# Patient Record
Sex: FEMALE | Race: WHITE | NOT HISPANIC OR LATINO | Employment: UNEMPLOYED | URBAN - METROPOLITAN AREA
[De-identification: names, ages, dates, MRNs, and addresses within clinical notes are randomized per-mention and may not be internally consistent; named-entity substitution may affect disease eponyms.]

---

## 2017-03-17 ENCOUNTER — TELEPHONE (OUTPATIENT)
Dept: PEDIATRICS | Facility: CLINIC | Age: 42
End: 2017-03-17

## 2017-03-17 DIAGNOSIS — G43.909 MIGRAINE WITHOUT STATUS MIGRAINOSUS, NOT INTRACTABLE, UNSPECIFIED MIGRAINE TYPE: ICD-10-CM

## 2017-03-17 RX ORDER — SUMATRIPTAN 100 MG/1
100 TABLET, FILM COATED ORAL
Qty: 18 TABLET | Refills: 0 | Status: SHIPPED | OUTPATIENT
Start: 2017-03-17 | End: 2022-04-01

## 2017-03-17 NOTE — TELEPHONE ENCOUNTER
Patient calls.  Asking for a refill on Imitrex.  It was d/c on the med list. On 11/2 when patient reported that Maxalt worked better at the time.  Patient saw Dr Kaur last time for migraine on 7/12/16.  Patient taking Maxalt, which is not always helping in case of more severe migraine.  Patient in [place] now, requesting to fill Imitrex for one month.  Dr Kaur not in the clinic today-will route to prescribing provider PCP-please advise if ok to fill.    Routing refill request to provider for review/approval because:  A break in medication    Zainab Knight RN  Message handled by Nurse Triage.

## 2017-03-17 NOTE — TELEPHONE ENCOUNTER
Notified pt, pt agrees to take only one tab of imitrex today. Called pharmacy, now I was notified that pt already picked up one tablet of imitrex today,  paid out of pocket. Need to start PA for more.  Started another encounter for PA.     Shelley, RN  Triage Nurse

## 2017-03-17 NOTE — TELEPHONE ENCOUNTER
Pt calling to notify that she took 2 tabs of maxalt 5 mg around 9:55 am for migraine, didn't get any better. So, she requested imitrex. Pharmacy told her that they can't dispense imitrex w/o provider approval. Pt is in [place].    Called pharmacy. Pt picked up maxalt this am, insurance won't pay for 2 different migraine med on one day w/o MD's approval.     Can pt take imitrex after she took 2 tabs of maxalt today? Will huddle with . Pt can be reached at [phone](OK to ).    Huddled with :  - because of pt already took Maxalt 10 mg, she can take only one tab of imitrex 100 mg today  - ok to provide verbal over ride to d/c maxalt & dispense imitrex    Shelley, RN  Triage Nurse

## 2017-03-17 NOTE — TELEPHONE ENCOUNTER
Pt was on maxalt, not helping. MD sent a rx for imitrex today. Pt already picked up maxalt from pharmacy today(lives in [place]). So, insurance won't pay for another migraine med for 3 months. Need to start PA for over ride. Please try to get PA done over the phone. Thanks.     Insurance: [facility]  Member ID: [id number]   [phone]    Please see the other encounter for details.     Shelley, RN  Triage Nurse

## 2017-03-17 NOTE — TELEPHONE ENCOUNTER
PA submitted over the phone, asked for urgent response, advised outcome will take 3 business days,await outcome, LMOM for pt with this information.

## 2017-03-17 NOTE — TELEPHONE ENCOUNTER
Just noticed that patient has not seen PCP since 2014, please advise if this should wait for Dr. Kaur, or route to covering providers at station B?    Zainab Knight RN  Message handled by Nurse Triage.

## 2017-03-20 NOTE — TELEPHONE ENCOUNTER
Pt would need to be seen to discuss preventive medications.  It sounds like she actually lives in [place] now and needs to establish with a physician there to review medication.

## 2017-03-20 NOTE — TELEPHONE ENCOUNTER
PA has been denied.     Reasoning behind determination:     1) Patient has not been evaluated for headache caused by medication overuse.     2) Patient is not currently using prophylactic medication or refused treatment with a prophylactic medication. Patient did not have side effect, FDA labeled contraindication, or allergy to these drugs.     **Doctor if you would like to discuss this case with a physician reviewer, anuradha call [employer] @ [phone]    Please advise how to continue.     Fernanda Cho CMA ([facility])

## 2018-01-18 ENCOUNTER — TELEPHONE (OUTPATIENT)
Dept: PEDIATRICS | Facility: CLINIC | Age: 43
End: 2018-01-18

## 2018-01-18 NOTE — TELEPHONE ENCOUNTER
"Patient Communication Preferences indicate  Do not contact  and/or communication by \"Phone\" is not preferred. Call not required per Outreach team.        Outreach ,  Marlena Toledo     "

## 2022-04-01 ENCOUNTER — ANCILLARY PROCEDURE (OUTPATIENT)
Dept: GENERAL RADIOLOGY | Facility: CLINIC | Age: 47
End: 2022-04-01
Attending: PHYSICIAN ASSISTANT
Payer: COMMERCIAL

## 2022-04-01 ENCOUNTER — OFFICE VISIT (OUTPATIENT)
Dept: FAMILY MEDICINE | Facility: CLINIC | Age: 47
End: 2022-04-01
Payer: COMMERCIAL

## 2022-04-01 VITALS
WEIGHT: 137 LBS | HEART RATE: 79 BPM | OXYGEN SATURATION: 99 % | BODY MASS INDEX: 24.27 KG/M2 | TEMPERATURE: 97.7 F | SYSTOLIC BLOOD PRESSURE: 102 MMHG | DIASTOLIC BLOOD PRESSURE: 68 MMHG

## 2022-04-01 DIAGNOSIS — G89.29 CHRONIC PAIN OF BOTH SHOULDERS: Primary | ICD-10-CM

## 2022-04-01 DIAGNOSIS — M79.645 PAIN OF FINGER OF LEFT HAND: ICD-10-CM

## 2022-04-01 DIAGNOSIS — M25.512 CHRONIC PAIN OF BOTH SHOULDERS: ICD-10-CM

## 2022-04-01 DIAGNOSIS — M25.511 CHRONIC PAIN OF BOTH SHOULDERS: ICD-10-CM

## 2022-04-01 DIAGNOSIS — M25.511 CHRONIC PAIN OF BOTH SHOULDERS: Primary | ICD-10-CM

## 2022-04-01 DIAGNOSIS — M25.512 CHRONIC PAIN OF BOTH SHOULDERS: Primary | ICD-10-CM

## 2022-04-01 DIAGNOSIS — G89.29 CHRONIC PAIN OF BOTH SHOULDERS: ICD-10-CM

## 2022-04-01 DIAGNOSIS — G43.909 MIGRAINE WITHOUT STATUS MIGRAINOSUS, NOT INTRACTABLE, UNSPECIFIED MIGRAINE TYPE: ICD-10-CM

## 2022-04-01 LAB
BASOPHILS # BLD AUTO: 0 10E3/UL (ref 0–0.2)
BASOPHILS NFR BLD AUTO: 0 %
EOSINOPHIL # BLD AUTO: 0.1 10E3/UL (ref 0–0.7)
EOSINOPHIL NFR BLD AUTO: 3 %
ERYTHROCYTE [DISTWIDTH] IN BLOOD BY AUTOMATED COUNT: 14.2 % (ref 10–15)
ERYTHROCYTE [SEDIMENTATION RATE] IN BLOOD BY WESTERGREN METHOD: 9 MM/HR (ref 0–20)
HCT VFR BLD AUTO: 39.7 % (ref 35–47)
HGB BLD-MCNC: 13.1 G/DL (ref 11.7–15.7)
LYMPHOCYTES # BLD AUTO: 1.9 10E3/UL (ref 0.8–5.3)
LYMPHOCYTES NFR BLD AUTO: 41 %
MCH RBC QN AUTO: 28.4 PG (ref 26.5–33)
MCHC RBC AUTO-ENTMCNC: 33 G/DL (ref 31.5–36.5)
MCV RBC AUTO: 86 FL (ref 78–100)
MONOCYTES # BLD AUTO: 0.3 10E3/UL (ref 0–1.3)
MONOCYTES NFR BLD AUTO: 7 %
NEUTROPHILS # BLD AUTO: 2.2 10E3/UL (ref 1.6–8.3)
NEUTROPHILS NFR BLD AUTO: 49 %
PLATELET # BLD AUTO: 239 10E3/UL (ref 150–450)
RBC # BLD AUTO: 4.62 10E6/UL (ref 3.8–5.2)
WBC # BLD AUTO: 4.5 10E3/UL (ref 4–11)

## 2022-04-01 PROCEDURE — 86431 RHEUMATOID FACTOR QUANT: CPT | Performed by: PHYSICIAN ASSISTANT

## 2022-04-01 PROCEDURE — 99204 OFFICE O/P NEW MOD 45 MIN: CPT | Performed by: PHYSICIAN ASSISTANT

## 2022-04-01 PROCEDURE — 85652 RBC SED RATE AUTOMATED: CPT | Performed by: PHYSICIAN ASSISTANT

## 2022-04-01 PROCEDURE — 84443 ASSAY THYROID STIM HORMONE: CPT | Performed by: PHYSICIAN ASSISTANT

## 2022-04-01 PROCEDURE — 73030 X-RAY EXAM OF SHOULDER: CPT | Mod: 50 | Performed by: RADIOLOGY

## 2022-04-01 PROCEDURE — 36415 COLL VENOUS BLD VENIPUNCTURE: CPT | Performed by: PHYSICIAN ASSISTANT

## 2022-04-01 PROCEDURE — 85025 COMPLETE CBC W/AUTO DIFF WBC: CPT | Performed by: PHYSICIAN ASSISTANT

## 2022-04-01 PROCEDURE — 73130 X-RAY EXAM OF HAND: CPT | Mod: LT | Performed by: RADIOLOGY

## 2022-04-01 RX ORDER — SUMATRIPTAN 100 MG/1
100 TABLET, FILM COATED ORAL
Qty: 18 TABLET | Refills: 0 | Status: SHIPPED | OUTPATIENT
Start: 2022-04-01 | End: 2022-07-06

## 2022-04-01 ASSESSMENT — PAIN SCALES - GENERAL: PAINLEVEL: SEVERE PAIN (6)

## 2022-04-01 NOTE — PROGRESS NOTES
"  Assessment & Plan       ICD-10-CM    1. Chronic pain of both shoulders  M25.511 XR Shoulder Bilateral G/E 2 Views    G89.29 CBC with platelets and differential    M25.512 Rheumatoid factor     ESR: Erythrocyte sedimentation rate     TSH with free T4 reflex     CBC with platelets and differential     Rheumatoid factor     ESR: Erythrocyte sedimentation rate     TSH with free T4 reflex   2. Pain of finger of left hand  M79.645 XR Finger Left G/E 2 Views     CBC with platelets and differential     Rheumatoid factor     ESR: Erythrocyte sedimentation rate     TSH with free T4 reflex     CBC with platelets and differential     Rheumatoid factor     ESR: Erythrocyte sedimentation rate     TSH with free T4 reflex   3. Migraine without status migrainosus, not intractable, unspecified migraine type  G43.909 SUMAtriptan (IMITREX) 100 MG tablet     - Evidence of supraspinatus tendinopathy on Lt, but given multiple joint involvement will screen for underlying RA. XR unremarkable. If no evidence of RA, consider referral to orthopedics for further evaluation and management, +/- PT.  - Hand exam unremarkable, unclear etiology of hand pain. RA work-up as above. May be related to Lt shoulder pain. Consider referral to hand therapy if RA work-up unremarkable.  - Imitrex remains effective, refills as above.    Return in about 1 week (around 4/8/2022), or if symptoms worsen or fail to improve.    WESTON [facility] Hospital[facility]KEDA Valles is a RHD 46 year old who presents for the following health issues     Shoulder Pain    History of Present Illness       Migraines:   Since the patient's last clinic visit, headaches are: worsened  The patient is getting headaches:  2-3 a week  She is not able to do normal daily activities when she has a migraine.  The patient is taking the following rescue/relief medications:  Excedrin and sumatriptan (Imitrex)   Patient states \"I get total relief\" from the " rescue/relief medications.   The patient is taking the following medications to prevent migraines:  No medications to prevent migraines  In the past 4 weeks, the patient has gone to an Urgent Care or Emergency Room 0 times times due to headaches.    Reason for visit:  Shoulder pain, pain in left fingers and migranes  Symptom onset:  More than a month  Symptoms include:  Migraines and shoulder pain and pain in left fingers  Symptom intensity:  Moderate  Symptom progression:  Worsening  Had these symptoms before:  Yes  Has tried/received treatment for these symptoms:  Yes  Previous treatment was successful:  Yes  Prior treatment description:  Steroids for shoulders  and sumatriptan for migranes  What makes it worse:  Sleeping on my side  What makes it better:  Voltaren    She eats 2-3 servings of fruits and vegetables daily.She consumes 0 sweetened beverage(s) daily.She exercises with enough effort to increase her heart rate 9 or less minutes per day.  She exercises with enough effort to increase her heart rate 3 or less days per week.   She is taking medications regularly.     - Patient with worsening Lt shoulder pain, also present in Rt but less severe. Was seen for similar symptoms at an outside clinic and given Medrol dosepak, which helped but symptoms ultimately continued. Pain is along anterior shoulder, difficulty with raising arm. Notes onset of pain in Lt 2nd-4th digits, hard to hold onto things. Of note, has chronic deformity on Lt 4th and 5th digits due to childhood fractures that were not set properly.  - Needs refill for Imitrex, continues to help with migraines prn.    Review of Systems   Constitutional, HEENT, cardiovascular, pulmonary, GI, , musculoskeletal, neuro, skin, endocrine and psych systems are negative, except as otherwise noted.      Objective    /68   Pulse 79   Temp 97.7  F (36.5  C) (Tympanic)   Wt 62.1 kg (137 lb)   LMP 03/18/2022 (Approximate)   SpO2 99%   BMI 24.27 kg/m     Body mass index is 24.27 kg/m .  Physical Exam   GENERAL:  WDWN, no acute distress  PSYCH: pleasant, cooperative  EYES: no discharge, no injection  HENT:  Normocephalic. Moist mucus membranes.  NECK:  Supple, symmetric  EXTREMITIES:  No gross deformities, moves all 4 limbs spontaneously  SKIN:  Warm and dry, no rash or suspicious lesions    NEUROLOGIC:  alert, sensation grossly intact.  Right Hand Exam   Right hand exam is normal.      Left Hand Exam     Tenderness   The patient is experiencing no tenderness.     Range of Motion   The patient has normal left wrist ROM.    Muscle Strength   The patient has normal left wrist strength.    Other   Erythema: absent  Sensation: normal      Right Shoulder Exam     Tenderness   The patient is experiencing tenderness in the acromioclavicular joint.    Range of Motion   The patient has normal right shoulder ROM.    Muscle Strength   The patient has normal right shoulder strength.    Tests   Drop arm: negative    Other   Sensation: normal      Left Shoulder Exam     Tenderness   The patient is experiencing tenderness in the acromioclavicular joint and biceps tendon.    Range of Motion   Active abduction: 90 (painful past 90 degrees, ROM limited by pain)   Forward flexion: 110 (painful past 90 degrees, ROM limited by pain)     Muscle Strength   The patient has normal left shoulder strength.    Tests   Drop arm: positive    Other   Sensation: normal             Xray, bilat shoulders and Lt hand - Reviewed and interpreted by me. Nothing acute by my read, awaiting radiology report

## 2022-04-03 LAB — TSH SERPL DL<=0.005 MIU/L-ACNC: 0.86 MU/L (ref 0.4–4)

## 2022-04-04 LAB — RHEUMATOID FACT SER NEPH-ACNC: 7 IU/ML

## 2022-04-06 ENCOUNTER — TELEPHONE (OUTPATIENT)
Dept: PEDIATRICS | Facility: CLINIC | Age: 47
End: 2022-04-06
Payer: COMMERCIAL

## 2022-04-06 DIAGNOSIS — G89.29 CHRONIC PAIN OF BOTH SHOULDERS: ICD-10-CM

## 2022-04-06 DIAGNOSIS — M25.512 CHRONIC PAIN OF BOTH SHOULDERS: ICD-10-CM

## 2022-04-06 DIAGNOSIS — M79.642 PAIN OF LEFT HAND: Primary | ICD-10-CM

## 2022-04-06 DIAGNOSIS — M25.511 CHRONIC PAIN OF BOTH SHOULDERS: ICD-10-CM

## 2022-04-06 NOTE — TELEPHONE ENCOUNTER
----- Message from Telma Almendarez PA-C sent at 4/6/2022  7:29 AM CDT -----  Please call patient with results:    - Your labs and x-rays were all negative/normal. I have placed a referral to see our orthopedic specialists to discuss the best way to proceed; you should receive a call to schedule an appointment within 2 business days.

## 2022-04-19 NOTE — PROGRESS NOTES
CHIEF COMPLAINT:  Pain of the Right Shoulder and Pain of the Left Shoulder     HISTORY OF PRESENT ILLNESS  Ms. Douglass is a pleasant 46 year old year old female who presents to clinic today with bilateral shoulder pain.  Reena explains that her right shoulder started becoming sore over the past year with her left shoulder now being works. Bilateral anterior shoulder pain without radiation. Also notes digits 2 and 3 of her left hand being weak and painful with gripping.    Onset: gradual increase without acute injury onset.  Location: bilateral anterior shoulders  Quality:  aching and nagging  Duration: 1 years   Severity: 8/10 at worst  Timing:constant  Modifying factors:  resting and non-use makes it better, movement and use makes it worse  Associated signs & symptoms: decreased ROM  Previous similar pain: No  Treatments to date: Oral steroids helped last year (in [place])    Additional history: as documented    Review of Systems:    Have you recently had a a fever, chills, weight loss? No    Do you have any vision problems? No    Do you have any chest pain or edema? No    Do you have any shortness of breath or wheezing?  No    Do you have stomach problems? No    Do you have any numbness or focal weakness? No    Do you have diabetes? No    Do you have problems with bleeding or clotting? No    Do you have an rashes or other skin lesions? No    MEDICAL HISTORY  Patient Active Problem List   Diagnosis     Genital herpes     CARDIOVASCULAR SCREENING; LDL GOAL LESS THAN 160     Migraine without status migrainosus, not intractable, unspecified migraine type       Current Outpatient Medications   Medication Sig Dispense Refill     EXCEDRIN MIGRAINE OR 2 tabs prn (usually once weekly)       SUMAtriptan (IMITREX) 100 MG tablet Take 1 tablet (100 mg) by mouth at onset of headache for migraine May repeat in 2 hours if needed: max 2/day 18 tablet 0       Allergies   Allergen Reactions     Hydrocodone-Acetaminophen Nausea and  Vomiting       Family History   Problem Relation Age of Onset     Hypertension Mother      Lipids Mother      Respiratory Mother         asthma     C.A.D. Maternal Grandfather      C.A.D. Maternal Uncle      Diabetes Maternal Aunt      Cancer Father         esophageal cancer       Additional medical/Social/Surgical histories [facility] in [facility] and updated as appropriate.       PHYSICAL EXAM  LMP 03/18/2022 (Approximate)     General  - normal appearance, in no obvious distress  Musculoskeletal -Left shoulder  - inspection: normal bone and joint alignment, no obvious deformity, no scapular winging, no AC step-off,   - palpation: Tenderness anterior joint line, normal clavicle, non-tender AC  - ROM:  Shoulder hiking with abduction, painful and limited forward elevation, abduction and internal rotation to L5. External rotation limited to 50 vs. 70 on right.    - strength: 5/5  strength, 4/5 shoulder strength in abduction due to pain.  5/5 ER and IR  - special tests:  (-) Speed's  (+) Neer  (+) Hawkin's  (+) Broderick's  (-) [place]'s  (-) apprehension  (-) subscap lift-off  Musculoskeletal - Right shoulder  - inspection: normal bone and joint alignment, no obvious deformity, no scapular winging, no AC step-off  - palpation: no bony or soft tissue tenderness, normal clavicle, non-tender AC  - ROM:  170 deg flexion   45 deg extension   150 deg abduction   70 deg ER   IR to mid thoracic  - strength: 5/5  strength, 5/5 in all shoulder planes  - special tests:  (-) Speed's  (-) Neer  (-) Hawkin's  (-) Broderick  (-) [place]'s  (-) apprehension  (-) subscap lift-off  Musculoskeletal - Left hand index and long fingers  - inspection: [place] neck deformities of third and fourth digits of left hand  - palpation: Palpable firm hypertrophy at PIP of index finger.  No effusion.  Nontender remainder of finger.    - ROM:  Index with 100 deg flexion and extension 0 degrees.  Third digit with PIP hyperextension by 10 degrees and flexion to  100 degrees.  - strength: 5/5  strength, 5/5 wrist abduction, 5/5 flexion, extension, pronation, supination, adduction  - special tests:  (-) varus  (-) valgus  (-) Carpal compression test for median nerve  Neuro  - no sensory or motor deficit, grossly normal coordination, normal muscle tone  Skin  - no ecchymosis, erythema, warmth, or induration, no obvious rash  Psych  - interactive, appropriate, normal mood and affect     IMAGING :      Recent Results (from the past 744 hour(s))   XR Hand Left G/E 3 Views    Narrative    HAND LEFT THREE OR MORE VIEWS April 1, 2022 10:19 AM     INDICATION: Left finger pain.     COMPARISON: None.      Impression    IMPRESSION:  1.  Normal joint spacing and alignment.  2.  No fracture.    TAMIA SALCIDO MD         SYSTEM ID:  [id number]   XR Shoulder Bilateral G/E 2 Views    Narrative    EXAM: SHOULDER BILATERAL TWO OR MORE VIEWS DATE/TIME: 4/1/2022 10:20  AM     INDICATION: Bilateral shoulder pain.   COMPARISON: None.      Impression    IMPRESSION:  1.  Right shoulder: Normal joint spacing and alignment. No fracture.  2.  Left shoulder: Normal joint spacing and alignment. No fracture.    TAMIA SALCIDO MD         SYSTEM ID:  [id number]       ASSESSMENT & PLAN  Ms. 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    Clinical concern for early osteoarthritic changes of joints of index and middle fingers.  X-rays reviewed and no gross abnormalitynarrowing.    Shoulder strength intact, range of motion severely limited in passive and active attempts.  Consistent with adhesive capsulitis of left shoulder, thawing phase and resolving of right shoulder condition.    Diagnosis:   1. Adhesive capsulitis of left shoulder  2. Chronic pain of bilateral shoulders  3. Pain of fingers of left hand    -Start formal PT  adhesive capulitis  -HEP frozen shoulder provided along with education  -Medrol saurabh prescribed  -Voltaren gel to fingers  -Consider OT, injection to fingers  -May consider MRI to left hand further elucidate joint pathologies.  -Follow up 6 weeks, consider corticosteroid injection under ultrasound guidance to the left shoulder if persisting and desires additional treatment.    It was a pleasure seeing Reena today.    Farrukh Scott DO, CAQSM  Primary Care Sports Medicine

## 2022-04-21 ENCOUNTER — OFFICE VISIT (OUTPATIENT)
Dept: ORTHOPEDICS | Facility: CLINIC | Age: 47
End: 2022-04-21
Payer: COMMERCIAL

## 2022-04-21 VITALS — SYSTOLIC BLOOD PRESSURE: 110 MMHG | BODY MASS INDEX: 24.27 KG/M2 | WEIGHT: 137 LBS | DIASTOLIC BLOOD PRESSURE: 62 MMHG

## 2022-04-21 DIAGNOSIS — M25.511 CHRONIC PAIN OF BOTH SHOULDERS: ICD-10-CM

## 2022-04-21 DIAGNOSIS — G89.29 CHRONIC PAIN OF BOTH SHOULDERS: ICD-10-CM

## 2022-04-21 DIAGNOSIS — M79.642 PAIN OF LEFT HAND: ICD-10-CM

## 2022-04-21 DIAGNOSIS — M25.512 CHRONIC PAIN OF BOTH SHOULDERS: ICD-10-CM

## 2022-04-21 DIAGNOSIS — M75.02 ADHESIVE CAPSULITIS OF LEFT SHOULDER: Primary | ICD-10-CM

## 2022-04-21 PROCEDURE — 99204 OFFICE O/P NEW MOD 45 MIN: CPT | Performed by: FAMILY MEDICINE

## 2022-04-21 RX ORDER — METHYLPREDNISOLONE 4 MG
TABLET, DOSE PACK ORAL
Qty: 21 TABLET | Refills: 0 | Status: SHIPPED | OUTPATIENT
Start: 2022-04-21 | End: 2023-05-23

## 2022-04-21 ASSESSMENT — PAIN SCALES - GENERAL: PAINLEVEL: EXTREME PAIN (8)

## 2022-04-21 NOTE — LETTER
4/21/2022         RE: Reena Douglass  [id number]  [place] [address]        Dear Colleague,    Thank you for referring your patient, Reena Douglass, to the [facility] [place]. Please see a copy of my visit note below.    CHIEF COMPLAINT:  Pain of the Right Shoulder and Pain of the Left Shoulder     HISTORY OF PRESENT ILLNESS  Ms. Douglass is a pleasant 46 year old year old female who presents to clinic today with bilateral shoulder pain.  Reena explains that her right shoulder started becoming sore over the past year with her left shoulder now being works. Bilateral anterior shoulder pain without radiation. Also notes digits 2 and 3 of her left hand being weak and painful with gripping.    Onset: gradual increase without acute injury onset.  Location: bilateral anterior shoulders  Quality:  aching and nagging  Duration: 1 years   Severity: 8/10 at worst  Timing:constant  Modifying factors:  resting and non-use makes it better, movement and use makes it worse  Associated signs & symptoms: decreased ROM  Previous similar pain: No  Treatments to date: Oral steroids helped last year (in [place])    Additional history: as documented    Review of Systems:    Have you recently had a a fever, chills, weight loss? No    Do you have any vision problems? No    Do you have any chest pain or edema? No    Do you have any shortness of breath or wheezing?  No    Do you have stomach problems? No    Do you have any numbness or focal weakness? No    Do you have diabetes? No    Do you have problems with bleeding or clotting? No    Do you have an rashes or other skin lesions? No    MEDICAL HISTORY  Patient Active Problem List   Diagnosis     Genital herpes     CARDIOVASCULAR SCREENING; LDL GOAL LESS THAN 160     Migraine without status migrainosus, not intractable, unspecified migraine type       Current Outpatient Medications   Medication Sig Dispense Refill     EXCEDRIN MIGRAINE OR 2 tabs prn  (usually once weekly)       SUMAtriptan (IMITREX) 100 MG tablet Take 1 tablet (100 mg) by mouth at onset of headache for migraine May repeat in 2 hours if needed: max 2/day 18 tablet 0       Allergies   Allergen Reactions     Hydrocodone-Acetaminophen Nausea and Vomiting       Family History   Problem Relation Age of Onset     Hypertension Mother      Lipids Mother      Respiratory Mother         asthma     C.A.D. Maternal Grandfather      C.A.D. Maternal Uncle      Diabetes Maternal Aunt      Cancer Father         esophageal cancer       Additional medical/Social/Surgical histories reviewed in [facility] and updated as appropriate.       PHYSICAL EXAM  LMP 03/18/2022 (Approximate)     General  - normal appearance, in no obvious distress  Musculoskeletal -Left shoulder  - inspection: normal bone and joint alignment, no obvious deformity, no scapular winging, no AC step-off,   - palpation: Tenderness anterior joint line, normal clavicle, non-tender AC  - ROM:  Shoulder hiking with abduction, painful and limited forward elevation, abduction and internal rotation to L5. External rotation limited to 50 vs. 70 on right.    - strength: 5/5  strength, 4/5 shoulder strength in abduction due to pain.  5/5 ER and IR  - special tests:  (-) Speed's  (+) Neer  (+) Hawkin's  (+) Broderick's  (-) [place]'s  (-) apprehension  (-) subscap lift-off  Musculoskeletal - Right shoulder  - inspection: normal bone and joint alignment, no obvious deformity, no scapular winging, no AC step-off  - palpation: no bony or soft tissue tenderness, normal clavicle, non-tender AC  - ROM:  170 deg flexion   45 deg extension   150 deg abduction   70 deg ER   IR to mid thoracic  - strength: 5/5  strength, 5/5 in all shoulder planes  - special tests:  (-) Speed's  (-) Neer  (-) Hawkin's  (-) Broderick  (-) [place]'s  (-) apprehension  (-) subscap lift-off  Musculoskeletal - Left hand index and long fingers  - inspection: [place] neck deformities of third and  fourth digits of left hand  - palpation: Palpable firm hypertrophy at PIP of index finger.  No effusion.  Nontender remainder of finger.    - ROM:  Index with 100 deg flexion and extension 0 degrees.  Third digit with PIP hyperextension by 10 degrees and flexion to 100 degrees.  - strength: 5/5  strength, 5/5 wrist abduction, 5/5 flexion, extension, pronation, supination, adduction  - special tests:  (-) varus  (-) valgus  (-) Carpal compression test for median nerve  Neuro  - no sensory or motor deficit, grossly normal coordination, normal muscle tone  Skin  - no ecchymosis, erythema, warmth, or induration, no obvious rash  Psych  - interactive, appropriate, normal mood and affect     IMAGING :      Recent Results (from the past 744 hour(s))   XR Hand Left G/E 3 Views    Narrative    HAND LEFT THREE OR MORE VIEWS April 1, 2022 10:19 AM     INDICATION: Left finger pain.     COMPARISON: None.      Impression    IMPRESSION:  1.  Normal joint spacing and alignment.  2.  No fracture.    TAMIA SALCIDO MD         SYSTEM ID:  [id number]   XR Shoulder Bilateral G/E 2 Views    Narrative    EXAM: SHOULDER BILATERAL TWO OR MORE VIEWS DATE/TIME: 4/1/2022 10:20  AM     INDICATION: Bilateral shoulder pain.   COMPARISON: None.      Impression    IMPRESSION:  1.  Right shoulder: Normal joint spacing and alignment. No fracture.  2.  Left shoulder: Normal joint spacing and alignment. No fracture.    TAMIA SALCIDO MD         SYSTEM ID:  [id number]       ASSESSMENT & PLAN  Ms. 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    Clinical concern for early osteoarthritic changes of joints of index and middle fingers.  X-rays reviewed and no gross abnormalitynarrowing.    Shoulder strength intact, range of motion severely limited in passive and active  attempts.  Consistent with adhesive capsulitis of left shoulder, thawing phase and resolving of right shoulder condition.    Diagnosis:   1. Adhesive capsulitis of left shoulder  2. Chronic pain of bilateral shoulders  3. Pain of fingers of left hand    -Start formal PT adhesive capulitis  -HEP frozen shoulder provided along with education  -Medrol saurabh prescribed  -Voltaren gel to fingers  -Consider OT, injection to fingers  -May consider MRI to left hand further elucidate joint pathologies.  -Follow up 6 weeks, consider corticosteroid injection under ultrasound guidance to the left shoulder if persisting and desires additional treatment.    It was a pleasure seeing Reena today.    Farrukh Scott DO, [facility]  Primary Care Sports Medicine        Again, thank you for allowing me to participate in the care of your patient.        Sincerely,        Farrukh Scott DO

## 2022-07-03 DIAGNOSIS — G43.909 MIGRAINE WITHOUT STATUS MIGRAINOSUS, NOT INTRACTABLE, UNSPECIFIED MIGRAINE TYPE: ICD-10-CM

## 2022-07-06 RX ORDER — SUMATRIPTAN 100 MG/1
TABLET, FILM COATED ORAL
Qty: 18 TABLET | Refills: 0 | Status: SHIPPED | OUTPATIENT
Start: 2022-07-06 | End: 2022-09-09

## 2022-07-06 NOTE — TELEPHONE ENCOUNTER
Routing refill request to provider for review/approval because:      Last office vist: 4/1/22    Pended 18 supply & 0 refills. Please Review.    Next office visit: none      Jo Alan RN  [facility]

## 2022-08-07 ENCOUNTER — HEALTH MAINTENANCE LETTER (OUTPATIENT)
Age: 47
End: 2022-08-07

## 2022-09-07 DIAGNOSIS — G43.909 MIGRAINE WITHOUT STATUS MIGRAINOSUS, NOT INTRACTABLE, UNSPECIFIED MIGRAINE TYPE: ICD-10-CM

## 2022-09-09 RX ORDER — SUMATRIPTAN 100 MG/1
TABLET, FILM COATED ORAL
Qty: 18 TABLET | Refills: 3 | Status: SHIPPED | OUTPATIENT
Start: 2022-09-09 | End: 2023-04-20

## 2022-10-23 ENCOUNTER — HEALTH MAINTENANCE LETTER (OUTPATIENT)
Age: 47
End: 2022-10-23

## 2023-04-20 DIAGNOSIS — G43.909 MIGRAINE WITHOUT STATUS MIGRAINOSUS, NOT INTRACTABLE, UNSPECIFIED MIGRAINE TYPE: ICD-10-CM

## 2023-04-20 RX ORDER — SUMATRIPTAN 100 MG/1
TABLET, FILM COATED ORAL
Qty: 18 TABLET | Refills: 0 | Status: SHIPPED | OUTPATIENT
Start: 2023-04-20 | End: 2023-05-23

## 2023-05-23 ENCOUNTER — ANCILLARY PROCEDURE (OUTPATIENT)
Dept: MAMMOGRAPHY | Facility: CLINIC | Age: 48
End: 2023-05-23
Payer: COMMERCIAL

## 2023-05-23 ENCOUNTER — OFFICE VISIT (OUTPATIENT)
Dept: FAMILY MEDICINE | Facility: CLINIC | Age: 48
End: 2023-05-23
Payer: COMMERCIAL

## 2023-05-23 VITALS
HEART RATE: 77 BPM | TEMPERATURE: 97.2 F | DIASTOLIC BLOOD PRESSURE: 76 MMHG | BODY MASS INDEX: 23.05 KG/M2 | SYSTOLIC BLOOD PRESSURE: 94 MMHG | WEIGHT: 135 LBS | HEIGHT: 64 IN | RESPIRATION RATE: 18 BRPM | OXYGEN SATURATION: 100 %

## 2023-05-23 DIAGNOSIS — Z13.21 ENCOUNTER FOR VITAMIN DEFICIENCY SCREENING: ICD-10-CM

## 2023-05-23 DIAGNOSIS — Z13.29 THYROID DISORDER SCREENING: ICD-10-CM

## 2023-05-23 DIAGNOSIS — Z13.1 SCREENING FOR DIABETES MELLITUS: ICD-10-CM

## 2023-05-23 DIAGNOSIS — Z12.4 CERVICAL CANCER SCREENING: ICD-10-CM

## 2023-05-23 DIAGNOSIS — Z13.0 SCREENING FOR DEFICIENCY ANEMIA: ICD-10-CM

## 2023-05-23 DIAGNOSIS — Z13.220 SCREENING CHOLESTEROL LEVEL: ICD-10-CM

## 2023-05-23 DIAGNOSIS — Z00.00 ROUTINE GENERAL MEDICAL EXAMINATION AT A HEALTH CARE FACILITY: Primary | ICD-10-CM

## 2023-05-23 DIAGNOSIS — Z12.31 VISIT FOR SCREENING MAMMOGRAM: ICD-10-CM

## 2023-05-23 DIAGNOSIS — G43.909 MIGRAINE WITHOUT STATUS MIGRAINOSUS, NOT INTRACTABLE, UNSPECIFIED MIGRAINE TYPE: ICD-10-CM

## 2023-05-23 PROCEDURE — 99396 PREV VISIT EST AGE 40-64: CPT | Performed by: NURSE PRACTITIONER

## 2023-05-23 PROCEDURE — G0124 SCREEN C/V THIN LAYER BY MD: HCPCS | Performed by: PATHOLOGY

## 2023-05-23 PROCEDURE — 87624 HPV HI-RISK TYP POOLED RSLT: CPT | Performed by: NURSE PRACTITIONER

## 2023-05-23 PROCEDURE — G0145 SCR C/V CYTO,THINLAYER,RESCR: HCPCS | Performed by: NURSE PRACTITIONER

## 2023-05-23 PROCEDURE — 77067 SCR MAMMO BI INCL CAD: CPT | Mod: TC | Performed by: RADIOLOGY

## 2023-05-23 RX ORDER — SUMATRIPTAN 100 MG/1
TABLET, FILM COATED ORAL
Qty: 18 TABLET | Refills: 5 | Status: SHIPPED | OUTPATIENT
Start: 2023-05-23 | End: 2024-07-16

## 2023-05-23 ASSESSMENT — ENCOUNTER SYMPTOMS
FEVER: 0
SHORTNESS OF BREATH: 0
DYSURIA: 0
DIZZINESS: 0
COUGH: 0
NAUSEA: 0
JOINT SWELLING: 0
BREAST MASS: 0
EYE PAIN: 0
ABDOMINAL PAIN: 0
SORE THROAT: 0
CHILLS: 0
ARTHRALGIAS: 1
HEADACHES: 1
NERVOUS/ANXIOUS: 0
HEMATURIA: 0
WEAKNESS: 0
DIARRHEA: 0
HEMATOCHEZIA: 1
CONSTIPATION: 1
MYALGIAS: 1
PARESTHESIAS: 0
PALPITATIONS: 0
FREQUENCY: 1
HEARTBURN: 0

## 2023-05-23 NOTE — PROGRESS NOTES
SUBJECTIVE:   CC: Reena is an 47 year old who presents for preventive health visit.       5/23/2023     1:43 PM   Additional Questions   Roomed by Cindy Mays MA   Accompanied by self     Patient has been advised of split billing requirements and indicates understanding: Yes  Healthy Habits:     Getting at least 3 servings of Calcium per day:  NO    Bi-annual eye exam:  NO    Dental care twice a year:  NO    Sleep apnea or symptoms of sleep apnea:  None    Diet:  Carbohydrate counting    Frequency of exercise:  1 day/week    Duration of exercise:  Less than 15 minutes    Taking medications regularly:  Yes    Medication side effects:  None    PHQ-2 Total Score: 0    Additional concerns today:  Yes    Migraine     Since your last clinic visit, how have your headaches changed? fluctuates    How often are you getting headaches or migraines? 3-4 times     Are you able to do normal daily activities when you have a migraine? Yes    Are you taking rescue/relief medications? (Select all that apply) Excedrin and sumatriptan (Imitrex)    How helpful is your rescue/relief medication?  I get some relief    Are you taking any medications to prevent migraines? (Select all that apply)  No    In the past 4 weeks, how often have you gone to urgent care or the emergency room because of your headaches?  0      Today's PHQ-2 Score:       5/23/2023     1:38 PM   PHQ-2 ( 1999 [employer])   Q1: Little interest or pleasure in doing things 0   Q2: Feeling down, depressed or hopeless 0   PHQ-2 Score 0   Q1: Little interest or pleasure in doing things Not at all   Q2: Feeling down, depressed or hopeless Not at all   PHQ-2 Score 0       Have you ever done Advance Care Planning? (For example, a Health Directive, POLST, or a discussion with a medical provider or your loved ones about your wishes): No, advance care planning information given to patient to review.  Patient plans to discuss their wishes with loved ones or provider.      Social  History     Tobacco Use     Smoking status: Former     Types: Cigarettes     Quit date: 2003     Years since quittin.4     Smokeless tobacco: Never   Vaping Use     Vaping status: Not on file   Substance Use Topics     Alcohol use: No             2023     1:38 PM   Alcohol Use   Prescreen: >3 drinks/day or >7 drinks/week? No     Reviewed orders with patient.  Reviewed health maintenance and updated orders accordingly - Yes  Lab work is in process    Breast Cancer Screenin/23/2023     1:39 PM   Breast CA Risk Assessment (FHS-7)   Do you have a family history of breast, colon, or ovarian cancer? No / Unknown       Mammogram Screening: Recommended annual mammography  Pertinent mammograms are reviewed under the imaging tab.    History of abnormal Pap smear: NO - age 30-65 PAP every 5 years with negative HPV co-testing recommended      3/27/2013    12:00 AM 10/28/2008    12:00 AM 2006    12:00 AM   PAP / HPV   PAP (Historical) NIL   NIL   NIL       Reviewed and updated as needed this visit by clinical staff   Tobacco  Allergies  Meds              Reviewed and updated as needed this visit by Provider                 Past Medical History:   Diagnosis Date     Diverticulitis of sigmoid colon 10/11/2013     Genital herpes, unspecified      Migraine       Past Surgical History:   Procedure Laterality Date     HC TOOTH EXTRACTION W/FORCEP       TUBAL LIGATION       ZZC EXCIS PTERYGIUM  ,      ZZC NONSPECIFIC PROCEDURE  1992    Spur-Heel       Review of Systems   Constitutional: Negative for chills and fever.   HENT: Negative for congestion, ear pain, hearing loss and sore throat.    Eyes: Positive for visual disturbance. Negative for pain.   Respiratory: Negative for cough and shortness of breath.    Cardiovascular: Negative for chest pain, palpitations and peripheral edema.   Gastrointestinal: Positive for constipation and hematochezia. Negative for abdominal pain, diarrhea,  "heartburn and nausea.   Breasts:  Positive for tenderness. Negative for breast mass and discharge.   Genitourinary: Positive for frequency. Negative for dysuria, genital sores, hematuria, pelvic pain, urgency, vaginal bleeding and vaginal discharge.   Musculoskeletal: Positive for arthralgias and myalgias. Negative for joint swelling.   Skin: Negative for rash.   Neurological: Positive for headaches. Negative for dizziness, weakness and paresthesias.   Psychiatric/Behavioral: Positive for mood changes. The patient is not nervous/anxious.           OBJECTIVE:   BP 94/76   Pulse 77   Temp 97.2  F (36.2  C)   Resp 18   Ht 1.626 m (5' 4\")   Wt 61.2 kg (135 lb)   LMP 05/18/2023   SpO2 100%   BMI 23.17 kg/m    Physical Exam  GENERAL: healthy, alert and no distress  EYES: Eyes grossly normal to inspection, PERRL and conjunctivae and sclerae normal  HENT: ear canals and TM's normal, nose and mouth without ulcers or lesions  NECK: no adenopathy, no asymmetry, masses, or scars and thyroid normal to palpation  RESP: lungs clear to auscultation - no rales, rhonchi or wheezes  CV: regular rate and rhythm, normal S1 S2, no S3 or S4, no murmur, click or rub, no peripheral edema and peripheral pulses strong  ABDOMEN: soft, nontender, no hepatosplenomegaly, no masses and bowel sounds normal  MS: no gross musculoskeletal defects noted, no edema  SKIN: no suspicious lesions or rashes  NEURO: Normal strength and tone, mentation intact and speech normal  PSYCH: mentation appears normal, affect normal/bright    Diagnostic Test Results:  Labs reviewed in Epic    ASSESSMENT/PLAN:   Reena was seen today for physical.    Diagnoses and all orders for this visit:    Routine general medical examination at a health care facility    Cervical cancer screening  -     Pap Screen with HPV - recommended age 30 - 65 years    Migraine without status migrainosus, not intractable, unspecified migraine type  -     SUMAtriptan (IMITREX) 100 MG " tablet; TAKE 1 TABLET(100 MG) BY MOUTH AT ONSET OF HEADACHE FOR MIGRAINE. MAY REPEAT IN 2 HOURS AS NEEDED:. MAX 2 PER DAY    Screening cholesterol level  -     Lipid panel reflex to direct LDL Non-fasting; Future    Encounter for vitamin deficiency screening  -     Vitamin D Deficiency; Future    Screening for deficiency anemia  -     CBC with platelets; Future    Screening for diabetes mellitus  -     Comprehensive metabolic panel (BMP + Alb, Alk Phos, ALT, AST, Total. Bili, TP); Future    Thyroid disorder screening  -     TSH with free T4 reflex; Future    Other orders  -     REVIEW OF HEALTH MAINTENANCE PROTOCOL ORDERS  -     PRIMARY CARE FOLLOW-UP SCHEDULING; Future    will request records from [place] for colonoscopy and tetanus.     Patient has been advised of split billing requirements and indicates understanding: Yes      COUNSELING:  Reviewed preventive health counseling, as reflected in patient instructions       Colorectal Cancer Screening        She reports that she quit smoking about 20 years ago. She has never used smokeless tobacco.      Marina Lilly CNP  [facility] [place]

## 2023-05-30 LAB
BKR LAB AP GYN ADEQUACY: ABNORMAL
BKR LAB AP GYN INTERPRETATION: ABNORMAL
BKR LAB AP HPV REFLEX: ABNORMAL
BKR LAB AP PREVIOUS ABNORMAL: ABNORMAL
PATH REPORT.COMMENTS IMP SPEC: ABNORMAL
PATH REPORT.COMMENTS IMP SPEC: ABNORMAL
PATH REPORT.RELEVANT HX SPEC: ABNORMAL

## 2023-06-02 PROBLEM — R87.610 ASCUS OF CERVIX WITH NEGATIVE HIGH RISK HPV: Status: ACTIVE | Noted: 2023-06-02

## 2023-06-02 LAB
HUMAN PAPILLOMA VIRUS 16 DNA: NEGATIVE
HUMAN PAPILLOMA VIRUS 18 DNA: NEGATIVE
HUMAN PAPILLOMA VIRUS FINAL DIAGNOSIS: NORMAL
HUMAN PAPILLOMA VIRUS OTHER HR: NEGATIVE

## 2023-06-08 ENCOUNTER — HOSPITAL ENCOUNTER (OUTPATIENT)
Dept: ULTRASOUND IMAGING | Facility: CLINIC | Age: 48
Discharge: HOME | End: 2023-06-08
Attending: INTERNAL MEDICINE
Payer: COMMERCIAL

## 2023-06-08 ENCOUNTER — HOSPITAL ENCOUNTER (OUTPATIENT)
Dept: MAMMOGRAPHY | Facility: CLINIC | Age: 48
Discharge: HOME | End: 2023-06-08
Attending: INTERNAL MEDICINE
Payer: COMMERCIAL

## 2023-06-08 DIAGNOSIS — R92.8 ABNORMAL MAMMOGRAM: ICD-10-CM

## 2023-06-08 PROCEDURE — 77061 BREAST TOMOSYNTHESIS UNI: CPT | Mod: RT

## 2023-06-08 PROCEDURE — 76642 ULTRASOUND BREAST LIMITED: CPT | Mod: RT

## 2024-07-16 ENCOUNTER — TELEPHONE (OUTPATIENT)
Dept: FAMILY MEDICINE | Facility: CLINIC | Age: 49
End: 2024-07-16
Payer: COMMERCIAL

## 2024-07-16 DIAGNOSIS — G43.909 MIGRAINE WITHOUT STATUS MIGRAINOSUS, NOT INTRACTABLE, UNSPECIFIED MIGRAINE TYPE: ICD-10-CM

## 2024-07-16 RX ORDER — SUMATRIPTAN 100 MG/1
TABLET, FILM COATED ORAL
Qty: 18 TABLET | Refills: 0 | Status: SHIPPED | OUTPATIENT
Start: 2024-07-16 | End: 2024-08-22

## 2024-08-17 ENCOUNTER — HEALTH MAINTENANCE LETTER (OUTPATIENT)
Age: 49
End: 2024-08-17

## 2024-08-22 ENCOUNTER — OFFICE VISIT (OUTPATIENT)
Dept: FAMILY MEDICINE | Facility: CLINIC | Age: 49
End: 2024-08-22
Payer: COMMERCIAL

## 2024-08-22 VITALS
BODY MASS INDEX: 20.16 KG/M2 | WEIGHT: 118.1 LBS | SYSTOLIC BLOOD PRESSURE: 90 MMHG | HEART RATE: 90 BPM | DIASTOLIC BLOOD PRESSURE: 60 MMHG | TEMPERATURE: 98.1 F | RESPIRATION RATE: 14 BRPM | OXYGEN SATURATION: 100 % | HEIGHT: 64 IN

## 2024-08-22 DIAGNOSIS — Z13.29 THYROID DISORDER SCREENING: ICD-10-CM

## 2024-08-22 DIAGNOSIS — Z13.21 ENCOUNTER FOR VITAMIN DEFICIENCY SCREENING: ICD-10-CM

## 2024-08-22 DIAGNOSIS — Z13.1 SCREENING FOR DIABETES MELLITUS: ICD-10-CM

## 2024-08-22 DIAGNOSIS — Z13.220 SCREENING CHOLESTEROL LEVEL: ICD-10-CM

## 2024-08-22 DIAGNOSIS — Z13.0 SCREENING FOR DEFICIENCY ANEMIA: ICD-10-CM

## 2024-08-22 DIAGNOSIS — Z00.00 ROUTINE PHYSICAL EXAMINATION: Primary | ICD-10-CM

## 2024-08-22 DIAGNOSIS — Z12.11 SCREEN FOR COLON CANCER: ICD-10-CM

## 2024-08-22 DIAGNOSIS — R06.83 SNORING: ICD-10-CM

## 2024-08-22 DIAGNOSIS — G43.909 MIGRAINE WITHOUT STATUS MIGRAINOSUS, NOT INTRACTABLE, UNSPECIFIED MIGRAINE TYPE: ICD-10-CM

## 2024-08-22 LAB
ALBUMIN SERPL BCG-MCNC: 4.7 G/DL (ref 3.5–5.2)
ALP SERPL-CCNC: 54 U/L (ref 40–150)
ALT SERPL W P-5'-P-CCNC: 14 U/L (ref 0–50)
ANION GAP SERPL CALCULATED.3IONS-SCNC: 12 MMOL/L (ref 7–15)
AST SERPL W P-5'-P-CCNC: 23 U/L (ref 0–45)
BILIRUB SERPL-MCNC: 0.4 MG/DL
BUN SERPL-MCNC: 11.9 MG/DL (ref 6–20)
CALCIUM SERPL-MCNC: 10 MG/DL (ref 8.8–10.4)
CHLORIDE SERPL-SCNC: 102 MMOL/L (ref 98–107)
CHOLEST SERPL-MCNC: 264 MG/DL
CREAT SERPL-MCNC: 0.74 MG/DL (ref 0.51–0.95)
EGFRCR SERPLBLD CKD-EPI 2021: >90 ML/MIN/1.73M2
ERYTHROCYTE [DISTWIDTH] IN BLOOD BY AUTOMATED COUNT: 13.5 % (ref 10–15)
FASTING STATUS PATIENT QL REPORTED: YES
FASTING STATUS PATIENT QL REPORTED: YES
GLUCOSE SERPL-MCNC: 86 MG/DL (ref 70–99)
HCO3 SERPL-SCNC: 26 MMOL/L (ref 22–29)
HCT VFR BLD AUTO: 39.2 % (ref 35–47)
HDLC SERPL-MCNC: 87 MG/DL
HGB BLD-MCNC: 13.4 G/DL (ref 11.7–15.7)
LDLC SERPL CALC-MCNC: 158 MG/DL
MCH RBC QN AUTO: 29.4 PG (ref 26.5–33)
MCHC RBC AUTO-ENTMCNC: 34.2 G/DL (ref 31.5–36.5)
MCV RBC AUTO: 86 FL (ref 78–100)
NONHDLC SERPL-MCNC: 177 MG/DL
PLATELET # BLD AUTO: 228 10E3/UL (ref 150–450)
POTASSIUM SERPL-SCNC: 4.2 MMOL/L (ref 3.4–5.3)
PROT SERPL-MCNC: 7.4 G/DL (ref 6.4–8.3)
RBC # BLD AUTO: 4.56 10E6/UL (ref 3.8–5.2)
SODIUM SERPL-SCNC: 140 MMOL/L (ref 135–145)
TRIGL SERPL-MCNC: 96 MG/DL
TSH SERPL DL<=0.005 MIU/L-ACNC: 1.42 UIU/ML (ref 0.3–4.2)
VIT D+METAB SERPL-MCNC: 33 NG/ML (ref 20–50)
WBC # BLD AUTO: 4.5 10E3/UL (ref 4–11)

## 2024-08-22 PROCEDURE — 80053 COMPREHEN METABOLIC PANEL: CPT | Performed by: NURSE PRACTITIONER

## 2024-08-22 PROCEDURE — 36415 COLL VENOUS BLD VENIPUNCTURE: CPT | Performed by: NURSE PRACTITIONER

## 2024-08-22 PROCEDURE — 82306 VITAMIN D 25 HYDROXY: CPT | Performed by: NURSE PRACTITIONER

## 2024-08-22 PROCEDURE — 99396 PREV VISIT EST AGE 40-64: CPT | Performed by: NURSE PRACTITIONER

## 2024-08-22 PROCEDURE — 85027 COMPLETE CBC AUTOMATED: CPT | Performed by: NURSE PRACTITIONER

## 2024-08-22 PROCEDURE — 80061 LIPID PANEL: CPT | Performed by: NURSE PRACTITIONER

## 2024-08-22 PROCEDURE — 84443 ASSAY THYROID STIM HORMONE: CPT | Performed by: NURSE PRACTITIONER

## 2024-08-22 RX ORDER — SUMATRIPTAN 100 MG/1
TABLET, FILM COATED ORAL
Qty: 18 TABLET | Refills: 5 | Status: SHIPPED | OUTPATIENT
Start: 2024-08-22

## 2024-08-22 SDOH — HEALTH STABILITY: PHYSICAL HEALTH: ON AVERAGE, HOW MANY DAYS PER WEEK DO YOU ENGAGE IN MODERATE TO STRENUOUS EXERCISE (LIKE A BRISK WALK)?: 7 DAYS

## 2024-08-22 SDOH — HEALTH STABILITY: PHYSICAL HEALTH: ON AVERAGE, HOW MANY MINUTES DO YOU ENGAGE IN EXERCISE AT THIS LEVEL?: 150+ MIN

## 2024-08-22 ASSESSMENT — SOCIAL DETERMINANTS OF HEALTH (SDOH): HOW OFTEN DO YOU GET TOGETHER WITH FRIENDS OR RELATIVES?: ONCE A WEEK

## 2024-08-22 NOTE — PROGRESS NOTES
Preventive Care Visit  [facility] [place]  Marina Lilly CNP, Nurse Practitioner - Family  Aug 22, 2024      Assessment & Plan     Routine physical examination    Migraine without status migrainosus, not intractable, unspecified migraine type  - SUMAtriptan (IMITREX) 100 MG tablet  Dispense: 18 tablet; Refill: 5    Screening for deficiency anemia  - CBC with platelets  - CBC with platelets    Screening for diabetes mellitus  - Comprehensive metabolic panel  - Comprehensive metabolic panel    Encounter for vitamin deficiency screening  - Vitamin D Deficiency  - Vitamin D Deficiency    Thyroid disorder screening  - TSH with free T4 reflex  - TSH with free T4 reflex    Screening cholesterol level  - Lipid panel reflex to direct LDL Non-fasting  - Lipid panel reflex to direct LDL Non-fasting    Screen for colon cancer  - Colonoscopy Screening  Referral    Snoring  - Adult Sleep Eval & Management Referral      Patient has been advised of split billing requirements and indicates understanding: Yes        Counseling  Appropriate preventive services were addressed with this patient via screening, questionnaire, or discussion as appropriate for fall prevention, nutrition, physical activity, Tobacco-use cessation, social engagement, weight loss and cognition.  Checklist reviewing preventive services available has been given to the patient.  Reviewed patient's diet, addressing concerns and/or questions.   The patient was instructed to see the dentist every 6 months.   She is at risk for psychosocial distress and has been provided with information to reduce risk.       See Patient Instructions    Christianne Valles is a 49 year old, presenting for the following:  Physical (Fasting)        8/22/2024    10:14 AM   Additional Questions   Roomed by Shirley        Health Care Directive  Patient does not have a Health Care Directive or Living Will: Discussed advance care planning with patient; however, patient  declined at this time.    HPI      Migraine   Since your last clinic visit, how have your headaches changed?  No change  How often are you getting headaches or migraines? 5 times a week    Are you able to do normal daily activities when you have a migraine? Yes  Are you taking rescue/relief medications? (Select all that apply) Excedrin and sumatriptan (Imitrex)  How helpful is your rescue/relief medication?  I get total relief  Are you taking any medications to prevent migraines? (Select all that apply)  No  In the past 4 weeks, how often have you gone to urgent care or the emergency room because of your headaches?  0        8/22/2024   General Health   How would you rate your overall physical health? Good   Feel stress (tense, anxious, or unable to sleep) Only a little      (!) STRESS CONCERN      8/22/2024   Nutrition   Three or more servings of calcium each day? Yes   Diet: Carbohydrate counting    Breakfast skipped    Gluten-free/reduced   How many servings of fruit and vegetables per day? (!) 2-3   How many sweetened beverages each day? 0-1       Multiple values from one day are sorted in reverse-chronological order         8/22/2024   Exercise   Days per week of moderate/strenous exercise 7 days   Average minutes spent exercising at this level 150+ min            8/22/2024   Social Factors   Frequency of gathering with friends or relatives Once a week   Worry food won't last until get money to buy more No   Food not last or not have enough money for food? No   Do you have housing? (Housing is defined as stable permanent housing and does not include staying ouside in a car, in a tent, in an abandoned building, in an overnight shelter, or couch-surfing.) Yes   Are you worried about losing your housing? No   Lack of transportation? No   Unable to get utilities (heat,electricity)? No            8/22/2024   Fall Risk   Fallen 2 or more times in the past year? No   Trouble with walking or balance? No              2024   Dental   Dentist two times every year? (!) NO            2024   TB Screening   Were you born outside of the [place]? Yes            Today's PHQ-2 Score:       2024    10:03 AM   PHQ-2 (  [employer])   Q1: Little interest or pleasure in doing things 0   Q2: Feeling down, depressed or hopeless 0   PHQ-2 Score 0   Q1: Little interest or pleasure in doing things Not at all   Q2: Feeling down, depressed or hopeless Not at all   PHQ-2 Score 0           2024   Substance Use   Alcohol more than 3/day or more than 7/wk No   Do you use any other substances recreationally? No        Social History     Tobacco Use    Smoking status: Former     Current packs/day: 0.00     Types: Cigarettes     Quit date: 2003     Years since quittin.6    Smokeless tobacco: Never   Vaping Use    Vaping status: Never Used   Substance Use Topics    Alcohol use: No    Drug use: No           2023   LAST FHS-7 RESULTS   1st degree relative breast or ovarian cancer No   Any relative bilateral breast cancer No   Any male have breast cancer No   Any ONE woman have BOTH breast AND ovarian cancer No   Any woman with breast cancer before 50yrs No   2 or more relatives with breast AND/OR ovarian cancer No   2 or more relatives with breast AND/OR bowel cancer No           Mammogram Screening - Mammogram every 1-2 years updated in Health Maintenance based on mutual decision making        2024   STI Screening   New sexual partner(s) since last STI/HIV test? No        History of abnormal Pap smear: ASCUS - age 30-64 HPV with reflex Pap every 3 years recommended        Latest Ref Rng & Units 2023     2:09 PM 3/27/2013    12:00 AM 10/28/2008    12:00 AM   PAP / HPV   PAP  Atypical squamous cells of undetermined significance (ASC-US)      PAP (Historical)   NIL  NIL    HPV 16 DNA Negative Negative      HPV 18 DNA Negative Negative      Other HR HPV Negative Negative        ASCVD Risk   The ASCVD Risk score  "(Marva PERALTA, et al., 2019) failed to calculate for the following reasons:    Cannot find a previous HDL lab    Cannot find a previous total cholesterol lab           Reviewed and updated as needed this visit by Provider                    Past Medical History:   Diagnosis Date    Diverticulitis of sigmoid colon 10/11/2013    Genital herpes, unspecified     Migraine          Review of Systems  Constitutional, HEENT, cardiovascular, pulmonary, GI, , musculoskeletal, neuro, skin, endocrine and psych systems are negative, except as otherwise noted.     Objective    Exam  BP 90/60   Pulse 90   Temp 98.1  F (36.7  C) (Tympanic)   Resp 14   Ht 1.613 m (5' 3.5\")   Wt 53.6 kg (118 lb 1.6 oz)   LMP 05/03/2024   SpO2 100%   BMI 20.59 kg/m     Estimated body mass index is 20.59 kg/m  as calculated from the following:    Height as of this encounter: 1.613 m (5' 3.5\").    Weight as of this encounter: 53.6 kg (118 lb 1.6 oz).    Physical Exam  GENERAL: alert and no distress  EYES: Eyes grossly normal to inspection, PERRL and conjunctivae and sclerae normal  HENT: ear canals and TM's normal, nose and mouth without ulcers or lesions  NECK: no adenopathy, no asymmetry, masses, or scars  RESP: lungs clear to auscultation - no rales, rhonchi or wheezes  CV: regular rate and rhythm, normal S1 S2, no S3 or S4, no murmur, click or rub, no peripheral edema  ABDOMEN: soft, nontender, no hepatosplenomegaly, no masses and bowel sounds normal  MS: no gross musculoskeletal defects noted, no edema  SKIN: no suspicious lesions or rashes  NEURO: Normal strength and tone, mentation intact and speech normal  PSYCH: mentation appears normal, affect normal/bright        Signed Electronically by: Marina Lilly, CNP    "

## 2024-10-15 ENCOUNTER — TELEPHONE (OUTPATIENT)
Dept: OBGYN | Facility: CLINIC | Age: 49
End: 2024-10-15

## 2024-10-15 NOTE — TELEPHONE ENCOUNTER
[facility]    Phone Message    May a detailed message be left on voicemail: yes     Reason for Call: Other: Pt called to schedule an appt for a possible bladder prolapse. Pt was scheduled for 12/12 as the next available. Pt is wondering if it is safe to wait that long if it is a prolapse. PT would like a sooner appt. Please call pt back to discuss.      Action Taken: Other: OBGYN    Travel Screening: Not Applicable     Date of Service:

## 2024-10-15 NOTE — TELEPHONE ENCOUNTER
Spoke with the pt and got her scheduled with Dr. DIXIE Fields on 11/5/24.    Chela JEFFERY RN  [facility]

## 2024-11-01 NOTE — PROGRESS NOTES
SUBJECTIVE:        I spent a total of 25 minutes on the care of Reena on the day of service including 20 minutes of face-to-face time with remainder in chart review, care coordination, documentation on the day of service.                                               Reena Douglass is a 49 year old female who presents to clinic today for the following health issue(s):  possible bladder prolapse   No chief complaint on file.    Patient is a 49-year-old P3 who presents for evaluation of pelvic relaxation.    She has lived for the last number of years in [place] and then recently moved back to [place].  She notes that her son misses the seasons and that was one of the motivations for returning.  She was evidently on a trampoline recently and doing a follow-up exercise and also felt like something had protruded down through the vaginal canal.  She first thought potentially was a tampon that had fallen out but she does not recall having any tampons in the vagina.  She also then noted that felt more like her uterus is dropping down.    She has had issues over the years with incontinence.  She notes that she will void frequently and does have a small amount of urgency by the sound of it but mostly stress incontinence.  She also notes a sense of pelvic pressure with standing and does extensive walking.    Family history:  Her mother evidently has had pelvic relaxation surgery recently and she has also had a history of incontinence.  There are uncles that have a variety of different cancers including colon cancer and prostate cancer.  There is a sister that is 1 year younger than her that has a history of ovarian cancer.    Past surgical history:  Regarding abdominal procedures she is only had her tubes tied    Past medical history is unremarkable see epic for details.    Physical exam:  She is pleasant and appropriate no apparent distress  Abdomen is soft nontender no guarding masses or rebound  Pelvic exam notes normal  external genitalia  Vaginal mucosa is normal normal rugated moisture  Cervix is without lesion  Uterus is freely mobile and not enlarged  Evaluation of the anterior cul-de-sac with a half speculum reveals a prominent cystocele that evaluation of the anterior cul-de-sac with a half speculum reveals a prominent cystocele that protrudes to the level of the introitus  Evaluation of the cervix notes that with the bladder supported and a half speculum the cervix does also descend all the way to the introitus  Evaluation of the posterior cul-de-sac and the posterior perineum reveals no significant rectocele.  Enterocele not evaluated      Patient and I talked extensively about options which would be 1 expectant management, 2 pessary use, 3 pelvic surgery.  I drew diagrams of the abdomen and explained what rectocele cystocele enterocele etc. are.  With a 10-day evolve and in her situation she has had 3 vaginal births.    We talked about the fact that she is a very active person.  Going on a cruise coming up next couple weeks.  Suggesting that a pessary would work but she might be more inclined for definitive surgery at this point and then suggest that she consider urogynecology for pelvic support.    Assessment:  Prominent cystocele and uterine prolapse, negative for rectocele    Plan:  Suggest she consider urogynecology surgery and referral was placed.  If she is inclined for pessaries would be happy to manage her for that    No LMP recorded. (Menstrual status: Irregular Periods)..       Today's PHQ-2 Score:       8/22/2024    10:03 AM   PHQ-2 ( 1999 [employer])   Q1: Little interest or pleasure in doing things 0    Q2: Feeling down, depressed or hopeless 0    PHQ-2 Score 0   Q1: Little interest or pleasure in doing things Not at all   Q2: Feeling down, depressed or hopeless Not at all   PHQ-2 Score 0       Patient-reported     Today's PHQ-9 Score:       6/17/2009     8:45 AM   PHQ-9 SCORE   PHQ-9 Total Score 2     Today's  LILIYA-7 Score:        No data to display                Problem list and histories reviewed & adjusted, as indicated.  Additional history: as documented.    Patient Active Problem List   Diagnosis    Genital herpes    CARDIOVASCULAR SCREENING; LDL GOAL LESS THAN 160    Migraine without status migrainosus, not intractable, unspecified migraine type    ASCUS of cervix with negative high risk HPV     Past Surgical History:   Procedure Laterality Date    HC TOOTH EXTRACTION W/FORCEP      TUBAL LIGATION      ZZC EXCIS PTERYGIUM  ,     ZZC NONSPECIFIC PROCEDURE  1992    Spur-Heel      Social History     Tobacco Use    Smoking status: Former     Current packs/day: 0.00     Types: Cigarettes     Quit date: 2003     Years since quittin.8    Smokeless tobacco: Never   Substance Use Topics    Alcohol use: No      Problem (# of Occurrences) Relation (Name,Age of Onset)    Cancer (1) Father (Prakash caraballo): esophageal cancer    Diabetes (1) Maternal Aunt    Hypertension (1) Mother (Ana Maria caraballo): Alzheimer's    Lipids (1) Mother (Ana Maria caraballo)    Respiratory (1) Mother (Ana Maria caraballo): asthma    C.A.D. (2) Maternal Grandfather, Maternal Uncle    Other Cancer (1) Daughter (Angeles saravia)    Hyperlipidemia (1) Father (Prakash caraballo)              Current Outpatient Medications   Medication Sig Dispense Refill    EXCEDRIN MIGRAINE OR 2 tabs prn (usually once weekly)      SUMAtriptan (IMITREX) 100 MG tablet TAKE 1 TABLET(100 MG) BY MOUTH AT ONSET OF HEADACHE FOR MIGRAINE. MAY REPEAT IN 2 HOURS AS NEEDED:. MAX 2 PER DAY 18 tablet 5     No current facility-administered medications for this visit.     Allergies   Allergen Reactions    Hydrocodone-Acetaminophen Nausea and Vomiting         OBJECTIVE:     There were no vitals taken for this visit.  There is no height or weight on file to calculate BMI.    Exam:  See above    In-Clinic Test Results:  No results found for this or any previous visit (from the past 24  hours).    ASSESSMENT/PLAN:                                                      See above    Farrukh Fields MD  [facility]

## 2024-11-05 ENCOUNTER — OFFICE VISIT (OUTPATIENT)
Dept: OBGYN | Facility: CLINIC | Age: 49
End: 2024-11-05
Payer: COMMERCIAL

## 2024-11-05 VITALS
BODY MASS INDEX: 20.2 KG/M2 | HEIGHT: 63 IN | WEIGHT: 114 LBS | SYSTOLIC BLOOD PRESSURE: 108 MMHG | DIASTOLIC BLOOD PRESSURE: 68 MMHG

## 2024-11-05 DIAGNOSIS — N81.89 PELVIC RELAXATION: Primary | ICD-10-CM

## 2024-11-05 PROCEDURE — 99203 OFFICE O/P NEW LOW 30 MIN: CPT | Performed by: OBSTETRICS & GYNECOLOGY

## 2024-11-05 NOTE — NURSING NOTE
"No chief complaint on file.      Initial /68   Ht 1.6 m (5' 3\")   Wt 51.7 kg (114 lb)   LMP 09/10/2024 (Approximate)   BMI 20.19 kg/m   Estimated body mass index is 20.19 kg/m  as calculated from the following:    Height as of this encounter: 1.6 m (5' 3\").    Weight as of this encounter: 51.7 kg (114 lb).  BP completed using cuff size: regular    Questioned patient about current smoking habits.  Pt. has never smoked.          The following HM Due: NONE    Dhara Neumann LPN on 2024 at 2:10 PM         "

## 2024-12-10 ENCOUNTER — VIRTUAL VISIT (OUTPATIENT)
Dept: SLEEP MEDICINE | Facility: CLINIC | Age: 49
End: 2024-12-10
Attending: NURSE PRACTITIONER
Payer: COMMERCIAL

## 2024-12-10 VITALS — WEIGHT: 111 LBS | BODY MASS INDEX: 19.67 KG/M2 | HEIGHT: 63 IN

## 2024-12-10 DIAGNOSIS — R51.9 UNCONTROLLED MORNING HEADACHE: ICD-10-CM

## 2024-12-10 DIAGNOSIS — R06.83 SNORING: ICD-10-CM

## 2024-12-10 DIAGNOSIS — Z72.820 POOR SLEEP: Primary | ICD-10-CM

## 2024-12-10 PROCEDURE — 99203 OFFICE O/P NEW LOW 30 MIN: CPT | Mod: 95 | Performed by: INTERNAL MEDICINE

## 2024-12-10 ASSESSMENT — SLEEP AND FATIGUE QUESTIONNAIRES
HOW LIKELY ARE YOU TO NOD OFF OR FALL ASLEEP WHILE SITTING INACTIVE IN A PUBLIC PLACE: WOULD NEVER DOZE
HOW LIKELY ARE YOU TO NOD OFF OR FALL ASLEEP WHILE LYING DOWN TO REST IN THE AFTERNOON WHEN CIRCUMSTANCES PERMIT: SLIGHT CHANCE OF DOZING
HOW LIKELY ARE YOU TO NOD OFF OR FALL ASLEEP WHILE SITTING QUIETLY AFTER LUNCH WITHOUT ALCOHOL: WOULD NEVER DOZE
HOW LIKELY ARE YOU TO NOD OFF OR FALL ASLEEP WHILE SITTING AND TALKING TO SOMEONE: WOULD NEVER DOZE
HOW LIKELY ARE YOU TO NOD OFF OR FALL ASLEEP WHEN YOU ARE A PASSENGER IN A CAR FOR AN HOUR WITHOUT A BREAK: WOULD NEVER DOZE
HOW LIKELY ARE YOU TO NOD OFF OR FALL ASLEEP WHILE WATCHING TV: SLIGHT CHANCE OF DOZING
HOW LIKELY ARE YOU TO NOD OFF OR FALL ASLEEP WHILE SITTING AND READING: SLIGHT CHANCE OF DOZING
HOW LIKELY ARE YOU TO NOD OFF OR FALL ASLEEP IN A CAR, WHILE STOPPED FOR A FEW MINUTES IN TRAFFIC: WOULD NEVER DOZE

## 2024-12-10 ASSESSMENT — PAIN SCALES - GENERAL: PAINLEVEL_OUTOF10: NO PAIN (0)

## 2024-12-10 NOTE — NURSING NOTE
Current patient location: [id number] [place] [id number]    Is the patient currently in the state of [place]? YES    Visit mode:VIDEO    If the visit is dropped, the patient can be reconnected by:VIDEO VISIT: Text to cell phone:   Telephone Information:   Mobile [phone]       Will anyone else be joining the visit? NO  (If patient encounters technical issues they should call [phone]    Are changes needed to the allergy or medication list? No    Are refills needed on medications prescribed by this physician? NO    Rooming Documentation:  Questionnaire(s) completed    Reason for visit: Consult    Sunil RAMIREZF

## 2024-12-10 NOTE — PATIENT INSTRUCTIONS
"          MY TREATMENT INFORMATION FOR SLEEP APNEA-  Reena Douglass    DOCTOR : Karoline Zambrano MD    Am I having a sleep study at a sleep center?  --->Due to normal delays, you will be contacted within 2-4 weeks to schedule    Am I having a home sleep study?  --->Watch the video for the device you are using:    -/drop off device-   https://www.TROD Medicalube.com/watch?v=yGGFBdELGhk    -Disposable device sent out require phone/computer application-   https://www.Liiiike.com/watch?v=BCce_vbiwxE      Frequently asked questions:  1. What is Obstructive Sleep Apnea (SARAH)? SARAH is the most common type of sleep apnea. Apnea means, \"without breath.\"  Apnea is most often caused by narrowing or collapse of the upper airway as muscles relax during sleep.   Almost everyone has occasional apneas. Most people with sleep apnea have had brief interruptions at night frequently for many years.  The severity of sleep apnea is related to how frequent and severe the events are.   2. What are the consequences of SARAH? Symptoms include: feeling sleepy during the day, snoring loudly, gasping or stopping of breathing, trouble sleeping, and occasionally morning headaches or heartburn at night.  Sleepiness can be serious and even increase the risk of falling asleep while driving. Other health consequences may include development of high blood pressure and other cardiovascular disease in persons who are susceptible. Untreated SARAH  can contribute to heart disease, stroke and diabetes.   3. What are the treatment options? In most situations, sleep apnea is a lifelong disease that must be managed with daily therapy. Medications are not effective for sleep apnea and surgery is generally not considered until other therapies have been tried. Your treatment is your choice . Continuous Positive Airway (CPAP) works right away and is the therapy that is effective in nearly everyone. An oral device to hold your jaw forward is usually the next most reliable " option. Other options include postioning devices (to keep you off your back), weight loss, and surgery including a tongue pacing device. There is more detail about some of these options below.  4. Are my sleep studies covered by insurance? Although we will request verification of coverage, we advise you also check in advance of the study to ensure there is coverage.    Important tips for those choosing CPAP and similar devices  REMEMBER-IF YOU RECEIVE A CALL FROM  [phone]-->IT IS TO SETUP A DEVICE  For new devices, sign up for device SHANNAN to monitor your device for your followup visits  We encourage you to utilize the [employer] shannan or website ( [email or web address] ) to monitor your therapy progress and share the data with your healthcare team when you discuss your sleep apnea.                                                    Know your equipment:  CPAP is continuous positive airway pressure that prevents obstructive sleep apnea by keeping the throat from collapsing while you are sleeping. In most cases, the device is  smart  and can slowly self-adjusts if your throat collapses and keeps a record every day of how well you are treated-this information is available to you and your care team.  BPAP is bilevel positive airway pressure that keeps your throat open and also assists each breath with a pressure boost to maintain adequate breathing.  Special kinds of BPAP are used in patients who have inadequate breathing from lung or heart disease. In most cases, the device is  smart  and can slowly self-adjusts to assist breathing. Like CPAP, the device keeps a record of how well you are treated.  Your mask is your connection to the device. You get to choose what feels most comfortable and the staff will help to make sure if fits. Here: are some examples of the different masks that are available: Magnetic mask aids may assist with use but there are safety issues that should be addressed when considering with  magnets* ( see end of discussion).       Key points to remember on your journey with sleep apnea:  Sleep study.  PAP devices often need to be adjusted during a sleep study to show that they are effective and adjusted right.  Good tips to remember: Try wearing just the mask during a quiet time during the day so your body adapts to wearing it. A humidifier is recommended for comfort in most cases to prevent drying of your nose and throat. Allergy medication from your provider may help you if you are having nasal congestion.  Getting settled-in. It takes more than one night for most of us to get used to wearing a mask. Try wearing just the mask during a quiet time during the day so your body adapts to wearing it. A humidifier is recommended for comfort in most cases. Our team will work with you carefully on the first day and will be in contact within 4 days and again at 2 and 4 weeks for advice and remote device adjustments. Your therapy is evaluated by the device each day.   Use it every night. The more you are able to sleep naturally for 7-8 hours, the more likely you will have good sleep and to prevent health risks or symptoms from sleep apnea. Even if you use it 4 hours it helps. Occasionally all of us are unable to use a medical therapy, in sleep apnea, it is not dangerous to miss one night.   Communicate. Call our skilled team on the number provided on the first day if your visit for problems that make it difficult to wear the device. Over 2 out of 3 patients can learn to wear the device long-term with help from our team. Remember to call our team or your sleep providers if you are unable to wear the device as we may have other solutions for those who cannot adapt to mask CPAP therapy. It is recommended that you sleep your sleep provider within the first 3 months and yearly after that if you are not having problems.   Use it for your health. We encourage use of CPAP masks during daytime quiet periods to allow  your face and brain to adapt to the sensation of CPAP so that it will be a more natural sensation to awaken to at night or during naps. This can be very useful during the first few weeks or months of adapting to CPAP though it does not help medically to wear CPAP during wakefulness and  should not be used as a strategy just to meet guidelines.  Take care of your equipment. Make sure you clean your mask and tubing using directions every day and that your filter and mask are replaced as recommended or if they are not working.     *Masks with magnets:  Updated Contraindications  Masks with magnetic components are contraindicated for use by patients where they, or anyone in close physical contact while using the mask, have the following:   Active medical implants that interact with magnets (i.e., pacemakers, implantable cardioverter defibrillators (ICD), neurostimulators, cerebrospinal fluid (CSF) shunts, insulin/infusion pumps)   Metallic implants/objects containing ferromagnetic material (i.e., aneurysm clips/flow disruption devices, embolic coils, stents, valves, electrodes, implants to restore hearing or balance with implanted magnets, ocular implants, metallic splinters in the eye)  Updated Warning  Keep the mask magnets at a safe distance of at least 6 inches (150 mm) away from implants or medical devices that may be adversely affected by magnetic interference. This warning applies to you or anyone in close physical contact with your mask. The magnets are in the frame and lower headgear clips, with a magnetic field strength of up to 400mT. When worn, they connect to secure the mask but may inadvertently detach while asleep.  Implants/medical devices, including those listed within contraindications, may be adversely affected if they change function under external magnetic fields or contain ferromagnetic materials that attract/repel to magnetic fields (some metallic implants, e.g., contact lenses with metal, dental  implants, metallic cranial plates, screws, [place] hole covers, and bone substitute devices). Consult your physician and  of your implant / other medical device for information on the potential adverse effects of magnetic fields.    BESIDES CPAP, WHAT OTHER THERAPIES ARE THERE?    Positioning Device  Positioning devices are generally used when sleep apnea is mild and only occurs on your back.This example shows a pillow that straps around the waist. It may be appropriate for those whose sleep study shows milder sleep apnea that occurs primarily when lying flat on one's back. Preliminary studies have shown benefit but effectiveness at home may need to be verified by a home sleep test. These devices are generally not covered by medical insurance.  Examples of devices that maintain sleeping on the back to prevent snoring and mild sleep apnea.    Belt type body positioner  http://Kudo/    Electronic reminder  http://nightshifttherapy.com/            Oral Appliance  What is oral appliance therapy?  An oral appliance device fits on your teeth at night like a retainer used after having braces. The device is made by a specialized dentist and requires several visits over 1-2 months before a manufactured device is made to fit your teeth and is adjusted to prevent your sleep apnea. Once an oral device is working properly, snoring should be improved. A home sleep test may be recommended at that time if to determine whether the sleep apnea is adequately treated.       Some things to remember:  -Oral devices are often, but not always, covered by your medical insurance. Be sure to check with your insurance provider.   -If you are referred for oral therapy, you will be given a list of specialized dentists to consider or you may choose to visit the Web site of the American Academy of Dental Sleep Medicine  -Oral devices are less likely to work if you have severe sleep apnea or are extremely overweight.     More  detailed information  An oral appliance is a small acrylic device that fits over the upper and lower teeth  (similar to a retainer or a mouth guard). This device slightly moves jaw forward, which moves the base of the tongue forward, opens the airway, improves breathing for effective treat snoring and obstructive sleep apnea in perhaps 7 out of 10 people .  The best working devices are custom-made by a dental device  after a mold is made of the teeth 1, 2, 3.  When is an oral appliance indicated?  Oral appliance therapy is recommended as a first-line treatment for patients with primary snoring, mild sleep apnea, and for patients with moderate sleep apnea who prefer appliance therapy to use of CPAP4, 5. Severity of sleep apnea is determined by sleep testing and is based on the number of respiratory events per hour of sleep.   How successful is oral appliance therapy?  The success rate of oral appliance therapy in patients with mild sleep apnea is 75-80% while in patients with moderate sleep apnea it is 50-70%. The chance of success in patients with severe sleep apnea is 40-50%. The research also shows that oral appliances have a beneficial effect on the cardiovascular health of SARAH patients at the same magnitude as CPAP therapy7.  Oral appliances should be a second-line treatment in cases of severe sleep apnea, but if not completely successful then a combination therapy utilizing CPAP plus oral appliance therapy may be effective. Oral appliances tend to be effective in a broad range of patients although studies show that the patients who have the highest success are females, younger patients, those with milder disease, and less severe obesity. 3, 6.   Finding a dentist that practices dental sleep medicine  Specific training is available through the American Academy of Dental Sleep Medicine for dentists interested in working in the field of sleep. To find a dentist who is educated in the field of sleep and  the use of oral appliances, near you, visit the Web site of the American Academy of Dental Sleep Medicine.    References  1. Aislinn, et al. Objectively measured vs self-reported compliance during oral appliance therapy for sleep-disordered breathing. Chest 2013; 144(5): 6130-5092.  2. Carson et al. Objective measurement of compliance during oral appliance therapy for sleep-disordered breathing. Thorax 2013; 68(1): 91-96.  3. Sammy et al. Mandibular advancement devices in 620 men and women with SARAH and snoring: tolerability and predictors of treatment success. Chest 2004; 125: 0416-6185.  4. Bang, et al. Oral appliances for snoring and SARAH: a review. Sleep 2006; 29: 244-262.  5. Skylar et al. Oral appliance treatment for SARAH: an update. J Clin Sleep Med 2014; 10(2): 215-227.  6. Manpreet et al. Predictors of OSAH treatment outcome. J Dent Res 2007; 86: 2624-8262.      Weight Loss:   Your Body mass index is 19.66 kg/m .    Being overweight does not necessarily mean you will have health consequences.  Those who have BMI over 35 or over 27 with existing medical conditions carries greater risk.   Weight loss decreases severity of sleep apnea in most people with obesity. For those with mild obesity who have developed snoring with weight gain, even 15-30 pound weight loss can improve and occasionally milder eliminate sleep apnea.  Structured and life-long dietary and health habits are necessary to lose weight and keep healthier weight levels.     The Comprehensive Weight loss program offers all aspects of weight loss strategies including two Non-Surgical Weight Loss Programs: Medical Weight Management and our 24 Week Healthy Lifestyle Program:    Medical Weight Management: You will meet with a Medical Weight Management Provider, as well as a Registered Dietician. The program may include medication therapy, dietary education, recommended exercise and physical therapy programs, monthly support group  meetings, and possible psychological counseling. Follow up visits with the provider or dietician are scheduled based on your progress and needs.    24 Week Healthy Lifestyle Program: This unique program is designed to give you the support of weekly appointments and activities thru a 24-week period. It may include all of the components of the basic program (above), with the addition of 11 individual Health  Visits, 24-week access to the [employer] website for over 700 online classes, and monthly support group meetings. This program has an out-of-pocket expense of $499 to cover the items that can not be billed to insurance (health coaches and [employer] access), and is non-refundable/non-transferable (you may be able to use a Health Savings Account; ask your HSA provider). There may be an optional meal replacement plan prescribed as well.   Surgical management achieves meaningful long-term weight loss and improvement in health risks in most patients with more severe obesity.      Sleep Apnea Surgery:    Surgery for obstructive sleep apnea is considered generally only when other therapies fail to work. Surgery may be discussed with you if you are having a difficult time tolerating CPAP and or when there is an abnormal structure that requires surgical correction.  Nose and throat surgeries often enlarge the airway to prevent collapse.  Most of these surgeries create pain for 1-2 weeks and up to half of the most common surgeries are not effective throughout life.  You should carefully discuss the benefits and drawbacks to surgery with your sleep provider and surgeon to determine if it is the best solution for you.   More information  Surgery for SARAH is directed at areas that are responsible for narrowing or complete obstruction of the airway during sleep.  There are a wide range of procedures available to enlarge and/or stabilize the airway to prevent blockage of breathing in the three major areas where it can occur:  the palate, tongue, and nasal regions.  Successful surgical treatment depends on the accurate identification of the factors responsible for obstructive sleep apnea in each person.  A personalized approach is required because there is no single treatment that works well for everyone.  Because of anatomic variation, consultation with an examination by a sleep surgeon is a critical first step in determining what surgical options are best for each patient.  In some cases, examination during sedation may be recommended in order to guide the selection of procedures.  Patients will be counseled about risks and benefits as well as the typical recovery course after surgery. Surgery is typically not a cure for a person s SARAH.  However, surgery will often significantly improve one s SARAH severity (termed  success rate ).  Even in the absence of a cure, surgery will decrease the cardiovascular risk associated with OSA7; improve overall quality of life8 (sleepiness, functionality, sleep quality, etc).      Palate Procedures:  Patients with SARAH often have narrowing of their airway in the region of their tonsils and uvula.  The goals of palate procedures are to widen the airway in this region as well as to help the tissues resist collapse.  Modern palate procedure techniques focus on tissue conservation and soft tissue rearrangement, rather than tissue removal.  Often the uvula is preserved in this procedure. Residual sleep apnea is common in patient after pharyngoplasty with an average reduction in sleep apnea events of 33%2.      Tongue Procedures:  ExamWhile patients are awake, the muscles that surround the throat are active and keep this region open for breathing. These muscles relax during sleep, allowing the tongue and other structures to collapse and block breathing.  There are several different tongue procedures available.  Selection of a tongue base procedure depends on characteristics seen on physical exam.  Generally,  procedures are aimed at removing bulky tissues in this area or preventing the back of the tongue from falling back during sleep.  Success rates for tongue surgery range from 50-62%3.    Hypoglossal Nerve Stimulation:  Hypoglossal nerve stimulation has recently received approval from the [place] Food and Drug Administration for the treatment of obstructive sleep apnea.  This is based on research showing that the system was safe and effective in treating sleep apnea6.  Results showed that the median AHI score decreased 68%, from 29.3 to 9.0. This therapy uses an implant system that senses breathing patterns and delivers mild stimulation to airway muscles, which keeps the airway open during sleep.  The system consists of three fully implanted components: a small generator (similar in size to a pacemaker), a breathing sensor, and a stimulation lead.  Using a small handheld remote, a patient turns the therapy on before bed and off upon awakening.    Candidates for this device must be greater than 18 years of age, have moderate to severe obstructive sleep apnea with less than 25% central events  (AHI between 15-65), BMI less than 35, have tried CPAP/oral appliance for at least 8 weeks without success, and have appropriate upper airway anatomy (determined by a sleep endoscopy performed by Dr. Ronald Roger or Dr. Regino Sepulveda).    Nasal Procedures:  Nasal obstruction can interfere with nasal breathing during the day and night.  Studies have shown that relief of nasal obstruction can improve the ability of some patients to tolerate positive airway pressure therapy for obstructive sleep apnea1.  Treatment options include medications such as nasal saline, topical corticosteroid and antihistamine sprays, and oral medications such as antihistamines or decongestants. Non-surgical treatments can include external nasal dilators for selected patients. If these are not successful by themselves, surgery can improve the nasal  airway either alone or in combination with these other options.        Combination Procedures:  Combination of surgical procedures and other treatments may be recommended, particularly if patients have more than one area of narrowing or persistent positional disease.  The success rate of combination surgery ranges from 66-80%2,3.    References  Cameron LAZARO. The Role of the Nose in Snoring and Obstructive Sleep Apnoea: An Update.  Eur Arch Otorhinolaryngol. 2011; 268: 1365-73.   Neena SM; Dylan JA; Marjorie JR; Pallanch JF; Tami MB; Rolanda SG; Ivan CONNELL. Surgical modifications of the upper airway for obstructive sleep apnea in adults: a systematic review and meta-analysis. SLEEP 2010;33(10):5579-1646. Valery ASHFORD. Hypopharyngeal surgery in obstructive sleep apnea: an evidence-based medicine review.  Arch Otolaryngol Head Neck Surg. 2006 Feb;132(2):206-13.  Andrew YH1, Bandar Y, Walter ANICETO. The efficacy of anatomically based multilevel surgery for obstructive sleep apnea. Otolaryngol Head Neck Surg. 2003 Oct;129(4):327-35.  Valery ASHFORD, Goldberg A. Hypopharyngeal Surgery in Obstructive Sleep Apnea: An Evidence-Based Medicine Review. Arch Otolaryngol Head Neck Surg. 2006 Feb;132(2):206-13.  Je HAINES et al. Upper-Airway Stimulation for Obstructive Sleep Apnea.  N Engl J Med. 2014 Jan 9;370(2):139-49.  Manisha Y et al. Increased Incidence of Cardiovascular Disease in Middle-aged Men with Obstructive Sleep Apnea. Am J Respir Crit Care Med; 2002 166: 159-165  Mabry EM et al. Studying Life Effects and Effectiveness of Palatopharyngoplasty (SLEEP) study: Subjective Outcomes of Isolated Uvulopalatopharyngoplasty. Otolaryngol Head Neck Surg. 2011; 144: 623-631.        WHAT IF I ONLY HAVE SNORING?    Mandibular advancement devices, lateral sleep positioning, long-term weight loss and treatment of nasal allergies have been shown to improve snoring.  Exercising tongue muscles with a game  (https://[employer].Tao [employer].demandmart/us/shannan/soundly-reduce-snoring/zp3633792301) or stimulating the tongue during the day with a device (https://doi.org/10.3390/mam88372387) have improved snoring in some individuals.  https://www.GANTEC.demandmart/  https://www.sleepfoundation.org/best-anti-snoring-mouthpieces-and-mouthguards    Remember to Drive Safe... Drive Alive     Sleep health profoundly affects your health, mood, and your safety.  Thirty three percent of the population (one in three of us) is not getting enough sleep and many have a sleep disorder. Not getting enough sleep or having an untreated / undertreated sleep condition may make us sleepy without even knowing it. In fact, our driving could be dramatically impaired due to our sleep health. As your provider, here are some things I would like you to know about driving:     Here are some warning signs for impairment and dangerous drowsy driving:              -Having been awake more than 16 hours               -Looking tired               -Eyelid drooping              -Head nodding (it could be too late at this point)              -Driving for more than 30 minutes     Some things you could do to make the driving safer if you are experiencing some drowsiness:              -Stop driving and rest              -Call for transportation              -Make sure your sleep disorder is adequately treated     Some things that have been shown NOT to work when experiencing drowsiness while driving:              -Turning on the radio              -Opening windows              -Eating any  distracting  /  entertaining  foods (e.g., sunflower seeds, candy, or any other)              -Talking on the phone      Your decision may not only impact your life, but also the life of others. Please, remember to drive safe for yourself and all of us.

## 2024-12-10 NOTE — PROGRESS NOTES
[facility] [address] [address]  Phone: [phone]    Patient:  Reena Douglass, Date of birth 1975  Date of Visit:  12/10/2024  Referring Provider Marina Lilly                Virtual Visit Details    Type of service:  Video Visit     Originating Location (pt. Location): Home    Distant Location (provider location):  On-site  Platform used for Video Visit: Bionaturis         Assessment and Plan:   Reena Douglass is a 49 year old female with history of migraines was been struggling with morning headaches for several years.  She has been snoring over the past 1 year as reported by her  and does report of nocturnal awakenings, nocturia and daytime fatigue.  Sleep disordered breathing.  Clinical history history and clinical presentation are quite suggestive with a high pretest probability for obstructive sleep apnea.  She does endorse a family history of obstructive sleep apnea in mother as well.  In the absence of any known cardiovascular and pulmonary comorbidities a type III home sleep test is a reasonable screening tool to determine the presence and severity of obstructive sleep apnea.  Morning headaches.  She has history of migraines and her early morning headaches are difficult to manage at this time.  Untreated sleep apnea can contribute to worsening symptoms.  Inadequate sleep hygiene.  Liyah does spend occasionally over an hour laying in bed either interacting with her cell phone or watching television  Before falling asleep.  Occasionally she will do the same in the middle of the night when she has to wake up to use the bathroom.       Comorbid Diagnoses:    Migraines.    Summary Recommendations:    Nox T3 Home sleep test to screen for and determine the severity of obstructive sleep apnea present.  Sleep hygiene with consistency of bed and wake time and stimulus control.    Summary Counseling:    Check out  [email or web address]           History of Present Illness:     Reena Douglass is a 49 year old female with above-mentioned medical history has been complaining of worsening migraine headaches particularly when she wakes up in the morning.  She has undergone extensive workup including a MRI/A brain.  For the past year she has been told by her  of her loud snoring and occasional gasp arousals.  She does wake up 3-4 times during the night either tossing and turning or with the urgency to use the bathroom.  Despite sleeping up to 8 hours she wakes up with morning grogginess and frequently with a headache.  During the daytime although she denies having much subjective sleepiness she does feel tired and fatigued.    Sleep wake schedule:   Bedtime 11:00 PM  Awakening 7:00 AM does not use alarm   Awakenings 3-4 for 5-10 minutes usually to use the bathroom or toss and turn/week  Naps: None     She does not feel rested in the morning.    [place] Sleepiness Scale: 3/24    BORIS Total Score: (Patient-Rptd) 16           Medications:     Current Outpatient Medications   Medication Sig Dispense Refill    EXCEDRIN MIGRAINE OR 2 tabs prn (usually once weekly)      SUMAtriptan (IMITREX) 100 MG tablet TAKE 1 TABLET(100 MG) BY MOUTH AT ONSET OF HEADACHE FOR MIGRAINE. MAY REPEAT IN 2 HOURS AS NEEDED:. MAX 2 PER DAY (Patient not taking: Reported on 11/5/2024) 18 tablet 5     No current facility-administered medications for this visit.        Allergies   Allergen Reactions    Hydrocodone-Acetaminophen Nausea and Vomiting            Past Medical History:     Does not need 02 supplement at night   Past Medical History:   Diagnosis Date    Diverticulitis of sigmoid colon 10/11/2013    Genital herpes, unspecified     Migraine              Past Surgical History:    No h/o  upper airway surgery  Past Surgical History:   Procedure Laterality Date    HC TOOTH EXTRACTION W/FORCEP      TUBAL LIGATION  2009    ZZC EXCIS PTERYGIUM  2002,  2004    [facility] NONSPECIFIC PROCEDURE  1992    Spur-Heel            Physical Examination:     Vitals:  No vitals were obtained today due to virtual visit.    Physical Exam   HEENT: Eyes grossly normal to inspection.  No discharge or erythema, or obvious scleral/conjunctival abnormalities.  Mallampati score 3  SKIN: Visible skin clear. No significant rash, abnormal pigmentation or lesions.  NEURO: Cranial nerves grossly intact.  Mentation and speech appropriate for age.  GENERAL: Healthy, alert and no distress  RESP: No audible wheeze, cough, or visible cyanosis.  No visible retractions or increased work of breathing.    PSYCH: Mentation appears normal, affect normal/bright, judgement and insight intact, normal speech and appearance well-groomed.    Copy to: No Ref-Primary, Physician    Total of 30 minutes of time was spent with patient, this included the interview and exam, and review of the chart/labs/imaging/sleep study/PAP therapy data on 12/10/2024 HEENT. Greater than 50% of which was spent counseling and coordinating care.   Karoline Zambrano MD 12/10/2024     [facility]   Fl[address], [address]   [address] [place] [address]. [place] [address]   Appointments: [phone]  Do not delete. Used for tracking note template use:[id number]}

## 2024-12-17 ENCOUNTER — NURSE TRIAGE (OUTPATIENT)
Dept: FAMILY MEDICINE | Facility: CLINIC | Age: 49
End: 2024-12-17
Payer: COMMERCIAL

## 2024-12-17 NOTE — TELEPHONE ENCOUNTER
2nd level triage- yes declined to be seen today.  Scheduled - tomorrow     Situation:  Called to see if she needs  an appointment for blood in her urine.     Background:  Never had a kidney stone before   Does not take blood thinners except   Takes Excedrin- 2 times a week. Last dose was in am( after voiding) previous to that a couple days ago    Assessment:  Pink on the toilet tissue after wiping/voiding x2 this am.  The first void stands of blood in urine turned toilet water pink  2nd void urine was yellow     Patient denied vaginal bleeding.     The last couple days had moderate lower abdominal pain- pain has been gone since yesterday.  Mild lower back pain yesterday-  today mild tenderness to touch    Denied pain or burning, frequency, urgency,odorous urine, cloudy urine, fever or chills. .    Nausea for one day a couple days ago.     Recommendation:  Advised to be seen today  Due to her schedule and other apt, am and late pm apt times do not work for her.   Patient asked about tomorrow  scheduled.   Advised new or worsening symptoms today go to  in [place] or [place], advised of hours.       Reason for Disposition   Side (flank) or back pain present   All other patients with blood in urine  (Exception: Could be normal menstrual bleeding.)    Additional Information   Negative: Shock suspected (e.g., cold/pale/clammy skin, too weak to stand, low BP, rapid pulse)   Negative: Sounds like a life-threatening emergency to the triager   Negative: Urinary catheter, questions about   Negative: Recent back or abdominal injury   Negative: Recent genital injury   Negative: Unable to urinate (or only a few drops) > 4 hours and bladder feels very full (e.g., palpable bladder or strong urge to urinate)   Negative: Diffuse (all over) muscle pains in the shoulders, arms, legs, and back and dark (cola or tea-colored) or red-colored urine   Negative: Passing pure blood or large blood clots (i.e., larger than a dime or grape)    Negative: Fever > 100.4 F (38.0 C)   Negative: Patient sounds very sick or weak to the triager   Negative: Known sickle cell disease   Negative: Taking Coumadin (warfarin) or other strong blood thinner, or known bleeding disorder (e.g., thrombocytopenia)   Negative: Pain or burning with passing urine (urination)    Protocols used: Urine - Blood In-A-OH

## 2024-12-18 ENCOUNTER — OFFICE VISIT (OUTPATIENT)
Dept: FAMILY MEDICINE | Facility: CLINIC | Age: 49
End: 2024-12-18
Payer: COMMERCIAL

## 2024-12-18 ENCOUNTER — HOSPITAL ENCOUNTER (OUTPATIENT)
Dept: CT IMAGING | Facility: CLINIC | Age: 49
Discharge: HOME | End: 2024-12-18
Attending: FAMILY MEDICINE
Payer: COMMERCIAL

## 2024-12-18 VITALS
BODY MASS INDEX: 20.04 KG/M2 | RESPIRATION RATE: 12 BRPM | HEART RATE: 92 BPM | OXYGEN SATURATION: 100 % | WEIGHT: 113.1 LBS | DIASTOLIC BLOOD PRESSURE: 68 MMHG | TEMPERATURE: 98.1 F | HEIGHT: 63 IN | SYSTOLIC BLOOD PRESSURE: 92 MMHG

## 2024-12-18 DIAGNOSIS — R10.32 LLQ ABDOMINAL PAIN: ICD-10-CM

## 2024-12-18 DIAGNOSIS — R31.0 GROSS HEMATURIA: Primary | ICD-10-CM

## 2024-12-18 DIAGNOSIS — R31.0 GROSS HEMATURIA: ICD-10-CM

## 2024-12-18 LAB
ALBUMIN UR-MCNC: NEGATIVE MG/DL
APPEARANCE UR: CLEAR
BILIRUB UR QL STRIP: NEGATIVE
COLOR UR AUTO: YELLOW
GLUCOSE UR STRIP-MCNC: NEGATIVE MG/DL
HGB UR QL STRIP: ABNORMAL
KETONES UR STRIP-MCNC: NEGATIVE MG/DL
LEUKOCYTE ESTERASE UR QL STRIP: NEGATIVE
NITRATE UR QL: NEGATIVE
PH UR STRIP: 6.5 [PH] (ref 5–7)
RBC #/AREA URNS AUTO: NORMAL /HPF
SP GR UR STRIP: 1.02 (ref 1–1.03)
UROBILINOGEN UR STRIP-ACNC: 0.2 E.U./DL
WBC #/AREA URNS AUTO: NORMAL /HPF

## 2024-12-18 PROCEDURE — 99214 OFFICE O/P EST MOD 30 MIN: CPT | Performed by: FAMILY MEDICINE

## 2024-12-18 PROCEDURE — G2211 COMPLEX E/M VISIT ADD ON: HCPCS | Performed by: FAMILY MEDICINE

## 2024-12-18 PROCEDURE — 74176 CT ABD & PELVIS W/O CONTRAST: CPT

## 2024-12-18 PROCEDURE — 87086 URINE CULTURE/COLONY COUNT: CPT | Performed by: FAMILY MEDICINE

## 2024-12-18 PROCEDURE — 81001 URINALYSIS AUTO W/SCOPE: CPT | Performed by: FAMILY MEDICINE

## 2024-12-18 NOTE — PROGRESS NOTES
"  Assessment & Plan     Gross hematuria  - UA Macroscopic with reflex to Microscopic and Culture - Lab Collect; Future  - UA Macroscopic with reflex to Microscopic and Culture - Lab Collect  - UA Microscopic with Reflex to Culture  - CT Abdomen Pelvis w/o Contrast; Future  - Urine Culture Aerobic Bacterial - lab collect; Future    LLQ abdominal pain  - CT Abdomen Pelvis w/o Contrast; Future      Unclear etiology for gross hematuria as urinalysis is clear today in clinic. She does have both LLQ and left flank tenderness and so will pursue CT scan to look for possible stone or other abnormality. If scan negative, recheck urinalysis and one month and consider referral to urology.    Christianne Valles is a 49 year old, presenting for the following health issues:  Kidney Problem (Patient states that she is spotting. /Left side hurts and lower abdominal pain )        12/18/2024     8:31 AM   Additional Questions   Roomed by Jam     Kidney Problem    History of Present Illness       Reason for visit:  Uti?, possible blood in urine, lower abdominal ki pain, left side pain, nausea  Symptom onset:  1-3 days ago  Symptoms include:  Uti?, possible blood in urine, lower abdominal pain, left side pain, nausea  Symptom intensity:  Mild  Symptom progression:  Worsening  Had these symptoms before:  No        She noticed some blood when wiping after urinating along with some clots.     She has also been nauseated for the past couple days, feeling more tired and having some flank pain on the left side and this morning more LLQ pain. She states she did wake up with some spotting. No dysuria, frequency, urgency, odor.      Review of Systems  Constitutional, HEENT, cardiovascular, pulmonary, gi and gu systems are negative, except as otherwise noted.      Objective    BP 92/68   Pulse 92   Temp 98.1  F (36.7  C) (Temporal)   Resp 12   Ht 1.6 m (5' 3\")   Wt 51.3 kg (113 lb 1.6 oz)   LMP 09/10/2024 (Approximate)   SpO2 100%   BMI " 20.03 kg/m    Body mass index is 20.03 kg/m .    Physical Exam   GENERAL: alert and no distress  RESP: lungs clear to auscultation - no rales, rhonchi or wheezes  CV: regular rates and rhythm, normal S1 S2, no S3 or S4, and no murmur, click or rub  ABDOMEN: soft, non-distended, mild tenderness in LLQ. No rigidity, rebound, or guarding  MS: some left flank tenderness on palpation    Results for orders placed or performed in visit on 12/18/24   UA Macroscopic with reflex to Microscopic and Culture - Lab Collect     Status: Abnormal    Specimen: Urine, Clean Catch   Result Value Ref Range    Color Urine Yellow Colorless, Straw, Light Yellow, Yellow    Appearance Urine Clear Clear    Glucose Urine Negative Negative mg/dL    Bilirubin Urine Negative Negative    Ketones Urine Negative Negative mg/dL    Specific Gravity Urine 1.020 1.003 - 1.035    Blood Urine Trace (A) Negative    pH Urine 6.5 5.0 - 7.0    Protein Albumin Urine Negative Negative mg/dL    Urobilinogen Urine 0.2 0.2, 1.0 E.U./dL    Nitrite Urine Negative Negative    Leukocyte Esterase Urine Negative Negative   UA Microscopic with Reflex to Culture     Status: Normal   Result Value Ref Range    RBC Urine None Seen 0-2 /HPF /HPF    WBC Urine None Seen 0-5 /HPF /HPF    Narrative    Urine Culture not indicated         The longitudinal plan of care for the diagnosis(es)/condition(s) as documented were addressed during this visit. Due to the added complexity in care, I will continue to support Reena in the subsequent management and with ongoing continuity of care.      Signed Electronically by: Quentin Castaneda DO

## 2024-12-19 DIAGNOSIS — K52.9 COLITIS: Primary | ICD-10-CM

## 2024-12-19 LAB — BACTERIA UR CULT: NO GROWTH

## 2024-12-19 RX ORDER — ONDANSETRON 4 MG/1
4 TABLET, ORALLY DISINTEGRATING ORAL EVERY 8 HOURS PRN
Qty: 30 TABLET | Refills: 0 | Status: SHIPPED | OUTPATIENT
Start: 2024-12-19

## 2025-02-26 ENCOUNTER — OFFICE VISIT (OUTPATIENT)
Dept: SLEEP MEDICINE | Facility: CLINIC | Age: 50
End: 2025-02-26
Attending: INTERNAL MEDICINE
Payer: COMMERCIAL

## 2025-02-26 DIAGNOSIS — R51.9 UNCONTROLLED MORNING HEADACHE: ICD-10-CM

## 2025-02-26 DIAGNOSIS — Z72.820 POOR SLEEP: ICD-10-CM

## 2025-02-26 DIAGNOSIS — R06.83 SNORING: ICD-10-CM

## 2025-02-26 ASSESSMENT — SLEEP AND FATIGUE QUESTIONNAIRES
HOW LIKELY ARE YOU TO NOD OFF OR FALL ASLEEP WHILE WATCHING TV: SLIGHT CHANCE OF DOZING
HOW LIKELY ARE YOU TO NOD OFF OR FALL ASLEEP IN A CAR, WHILE STOPPED FOR A FEW MINUTES IN TRAFFIC: WOULD NEVER DOZE
HOW LIKELY ARE YOU TO NOD OFF OR FALL ASLEEP WHILE SITTING AND READING: SLIGHT CHANCE OF DOZING
HOW LIKELY ARE YOU TO NOD OFF OR FALL ASLEEP WHILE SITTING QUIETLY AFTER LUNCH WITHOUT ALCOHOL: WOULD NEVER DOZE
HOW LIKELY ARE YOU TO NOD OFF OR FALL ASLEEP WHEN YOU ARE A PASSENGER IN A CAR FOR AN HOUR WITHOUT A BREAK: WOULD NEVER DOZE
HOW LIKELY ARE YOU TO NOD OFF OR FALL ASLEEP WHILE SITTING AND TALKING TO SOMEONE: WOULD NEVER DOZE
HOW LIKELY ARE YOU TO NOD OFF OR FALL ASLEEP WHILE SITTING INACTIVE IN A PUBLIC PLACE: WOULD NEVER DOZE
HOW LIKELY ARE YOU TO NOD OFF OR FALL ASLEEP WHILE LYING DOWN TO REST IN THE AFTERNOON WHEN CIRCUMSTANCES PERMIT: SLIGHT CHANCE OF DOZING

## 2025-02-26 NOTE — PROGRESS NOTES
Pt is completing a home sleep test. Pt was instructed on how to put on the Noxturnal T3 device and associated equipment before going to bed and given the opportunity to practice putting it on before leaving the sleep center. Pt was reminded to bring the home sleep test kit back to the center tomorrow, at the scheduled time for download and reporting. Patient was instructed to complete study using the following treatment?  None  Neck circumference: 31 CM / 12 inches.  Device number: 23  Aleena Willson CMA on 2/26/2025 at 11:39 AM

## 2025-02-27 ENCOUNTER — DOCUMENTATION ONLY (OUTPATIENT)
Dept: SLEEP MEDICINE | Facility: CLINIC | Age: 50
End: 2025-02-27
Payer: COMMERCIAL

## 2025-02-27 NOTE — PROGRESS NOTES
Pt returned HST device. It was downloaded and forwarded data to the clinical specialist for scoring.   Aleena Willson CMA on 2/27/2025 at 10:23 AM

## 2025-02-27 NOTE — PROGRESS NOTES
HST POST-STUDY QUESTIONNAIRE    What time did you go to bed?  9:30  How long do you think it took to fall asleep?  20 min  What time did you wake up to start the day?  8:30  Did you get up during the night at all?  Yes  If you woke up, do you remember approximately what time(s)? 12:30, 2, and 4 am.  Did you have any difficulty with the equipment?  No  Did you us any type of treatment with this study?  None  Was the head of the bed elevated? No  Did you sleep in a recliner?  No  Did you stop using CPAP at least 3 days before this test?  No   Any other information you'd like us to know? Did not sleep well and woke up with a migraine.

## 2025-06-02 ENCOUNTER — TELEPHONE (OUTPATIENT)
Dept: GASTROENTEROLOGY | Facility: CLINIC | Age: 50
End: 2025-06-02
Payer: COMMERCIAL

## 2025-06-02 DIAGNOSIS — Z12.11 SCREEN FOR COLON CANCER: Primary | ICD-10-CM

## 2025-06-02 RX ORDER — BISACODYL 5 MG
TABLET, DELAYED RELEASE (ENTERIC COATED) ORAL
Qty: 4 TABLET | Refills: 0 | Status: SHIPPED | OUTPATIENT
Start: 2025-06-02

## 2025-06-02 NOTE — TELEPHONE ENCOUNTER
"Endoscopy Scheduling Screen    Caller: patient    Have you had any respiratory illness or flu-like symptoms in the last 10 days?  No    What is your communication preference for Instructions and/or Bowel Prep?   MyChart    What insurance is in the chart?  Other:  bcbs    Ordering/Referring Provider: Marina Lilly CNP     (If ordering provider performs procedure, schedule with ordering provider unless otherwise instructed. )    BMI: Estimated body mass index is 20.03 kg/m  as calculated from the following:    Height as of 12/18/24: 1.6 m (5' 3\").    Weight as of 12/18/24: 51.3 kg (113 lb 1.6 oz).     Sedation Ordered  moderate sedation.   If patient BMI > 50 do not schedule in [facility].    If patient BMI > 45 do not schedule at [facility].    Are you taking methadone or Suboxone?  NO, No RN review required.    Have you been diagnosed and are being treated for severe PTSD or severe anxiety?  NO, No RN review required.    Are you taking any prescription medications for pain 3 or more times per week?   NO, No RN review required.    Do you have a history of malignant hyperthermia?  No    (Females) Are you currently pregnant?   No     Have you been diagnosed or told you have pulmonary hypertension?   No    Do you have an LVAD?  No    Have you been told you have moderate to severe sleep apnea?  No.    Have you been told you have COPD, asthma, or any other lung disease?  No    Has your doctor ordered any cardiac tests like echo, angiogram, stress test, ablation, or EKG, that you have not completed yet?  No    Do you  have a history of any heart conditions?  No     Have you ever had or are you waiting for an organ transplant?  No. Continue scheduling, no site restrictions.    Have you had a stroke or transient ischemic attack (TIA aka \"mini stroke\") in the last 2 years?   No.    Have you been diagnosed with or been told you have cirrhosis of the liver?   No.    Are you currently on dialysis?   No    Do you need assistance " "transferring?   No    BMI: Estimated body mass index is 20.03 kg/m  as calculated from the following:    Height as of 12/18/24: 1.6 m (5' 3\").    Weight as of 12/18/24: 51.3 kg (113 lb 1.6 oz).     Is patients BMI > 40 and scheduling location [facility]?  No    Do you take an injectable or oral medication for weight loss or diabetes (excluding insulin)?  No    Do you take the medication Naltrexone?  No    Do you take blood thinners?  No       Prep   Are you currently on dialysis or do you have chronic kidney disease?  No    Do you have a diagnosis of diabetes?  No    Do you have a diagnosis of cystic fibrosis (CF)?  No    On a regular basis do you go 3 -5 days between bowel movements?  Yes (Extended Prep)    BMI > 40?  No    Preferred Pharmacy:      [id number] - [place] [address] AT [place] [address] [address] [address]  Phone: [phone] Fax: [phone]      Final Scheduling Details     Procedure scheduled  Colonoscopy    Surgeon:  daren     Date of procedure:  6/11/25     Pre-OP / PAC:   No - Not required for this site.    Location  [facility] - Patient preference.    Sedation   Moderate Sedation - Per order.      Patient Reminders:   You will receive a call from a Nurse to review instructions and health history.  This assessment must be completed prior to your procedure.  Failure to complete the Nurse assessment may result in the procedure being cancelled.      On the day of your procedure, please designate an adult(s) who can drive you home stay with you for the next 24 hours. The medicines used in the exam will make you sleepy. You will not be able to drive.      You cannot take public transportation, ride share services, or non-medical taxi service without a responsible caregiver.  Medical transport services are allowed with the requirement that a responsible caregiver will receive you at your destination.  We require that drivers and caregivers are " confirmed prior to your procedure.

## 2025-06-02 NOTE — TELEPHONE ENCOUNTER
Pre visit planning completed.      Procedure details:    Patient scheduled for Colonoscopy on 06.11.2025.     Arrival time: 1000. Procedure time 1045    Facility location: [facility]; [address] [place] [address]. Check in location: Main entrance, door #1 on the North side of the building under roundabout awning. DO NOT GO TO SURGERY/ED ENTRANCE.     Sedation type: Conscious sedation     Pre op exam needed? No.    Indication for procedure:  Screen for colon cancer       Chart review:     Electronic implanted devices? No    Recent diagnosis of diverticulitis within the last 6 weeks? No      Medication review:    Diabetic? No    Anticoagulants? No    Weight loss medication/injectable? No GLP-1 medication per patient's medication list. Nursing to verify with pre-assessment call.    Other medication HOLDING recommendations:  N/A      Prep for procedure:     Bowel prep recommendation: Extended Golytely. Bowel prep sent to    [id number] - [place], [place] - [address] [place] [address] [address].  Due to: constipation noted or reported    Procedure information and instructions sent via Sequent         Aretha Diaz RN  Endoscopy Procedure Pre Assessment   [phone] option 3

## 2025-06-02 NOTE — TELEPHONE ENCOUNTER
Pre assessment completed for upcoming procedure.   (Please see previous telephone encounter notes for complete details)           Procedure details:    Arrival time and facility location reviewed.    Pre op exam needed? No.    Designated  policy reviewed. Instructed to have someone stay 6  hours post procedure.       Medication review:    Medications reviewed. Please see supporting documentation below. Holding recommendations discussed (if applicable).   Fiber supplements: HOLD 7 days before procedure.      Prep for procedure:     Procedure prep instructions reviewed.        Any additional information needed:  N/A      Patient verbalized understanding and had no questions or concerns at this time.      Reta Alvarado LPN  Endoscopy Procedure Pre Assessment   [phone] option 3

## 2025-06-11 ENCOUNTER — HOSPITAL ENCOUNTER (OUTPATIENT)
Facility: CLINIC | Age: 50
Discharge: HOME | End: 2025-06-11
Attending: INTERNAL MEDICINE | Admitting: INTERNAL MEDICINE
Payer: COMMERCIAL

## 2025-06-11 VITALS
SYSTOLIC BLOOD PRESSURE: 104 MMHG | RESPIRATION RATE: 16 BRPM | OXYGEN SATURATION: 100 % | HEIGHT: 63 IN | TEMPERATURE: 98.5 F | BODY MASS INDEX: 19.14 KG/M2 | DIASTOLIC BLOOD PRESSURE: 77 MMHG | WEIGHT: 108 LBS | HEART RATE: 76 BPM

## 2025-06-11 LAB — COLONOSCOPY: NORMAL

## 2025-06-11 PROCEDURE — G0500 MOD SEDAT ENDO SERVICE >5YRS: HCPCS | Performed by: INTERNAL MEDICINE

## 2025-06-11 PROCEDURE — G0121 COLON CA SCRN NOT HI RSK IND: HCPCS | Performed by: INTERNAL MEDICINE

## 2025-06-11 PROCEDURE — 258N000003 HC RX IP 258 OP 636: Performed by: INTERNAL MEDICINE

## 2025-06-11 PROCEDURE — 250N000011 HC RX IP 250 OP 636: Mod: JZ | Performed by: INTERNAL MEDICINE

## 2025-06-11 PROCEDURE — 99153 MOD SED SAME PHYS/QHP EA: CPT | Performed by: INTERNAL MEDICINE

## 2025-06-11 RX ORDER — FLUMAZENIL 0.1 MG/ML
0.2 INJECTION, SOLUTION INTRAVENOUS
Status: DISCONTINUED | OUTPATIENT
Start: 2025-06-11 | End: 2025-06-11 | Stop reason: HOSPADM

## 2025-06-11 RX ORDER — ONDANSETRON 4 MG/1
4 TABLET, ORALLY DISINTEGRATING ORAL EVERY 6 HOURS PRN
Status: DISCONTINUED | OUTPATIENT
Start: 2025-06-11 | End: 2025-06-11 | Stop reason: HOSPADM

## 2025-06-11 RX ORDER — NALOXONE HYDROCHLORIDE 0.4 MG/ML
0.4 INJECTION, SOLUTION INTRAMUSCULAR; INTRAVENOUS; SUBCUTANEOUS
Status: DISCONTINUED | OUTPATIENT
Start: 2025-06-11 | End: 2025-06-11 | Stop reason: HOSPADM

## 2025-06-11 RX ORDER — PROCHLORPERAZINE MALEATE 10 MG
10 TABLET ORAL EVERY 6 HOURS PRN
Status: DISCONTINUED | OUTPATIENT
Start: 2025-06-11 | End: 2025-06-11 | Stop reason: HOSPADM

## 2025-06-11 RX ORDER — EPINEPHRINE 1 MG/ML
0.1 INJECTION, SOLUTION, CONCENTRATE INTRAVENOUS
Status: DISCONTINUED | OUTPATIENT
Start: 2025-06-11 | End: 2025-06-11 | Stop reason: HOSPADM

## 2025-06-11 RX ORDER — NALOXONE HYDROCHLORIDE 0.4 MG/ML
0.2 INJECTION, SOLUTION INTRAMUSCULAR; INTRAVENOUS; SUBCUTANEOUS
Status: DISCONTINUED | OUTPATIENT
Start: 2025-06-11 | End: 2025-06-11 | Stop reason: HOSPADM

## 2025-06-11 RX ORDER — SIMETHICONE 40MG/0.6ML
133 SUSPENSION, DROPS(FINAL DOSAGE FORM)(ML) ORAL
Status: DISCONTINUED | OUTPATIENT
Start: 2025-06-11 | End: 2025-06-11 | Stop reason: HOSPADM

## 2025-06-11 RX ORDER — FENTANYL CITRATE 50 UG/ML
25-100 INJECTION, SOLUTION INTRAMUSCULAR; INTRAVENOUS EVERY 5 MIN PRN
Refills: 0 | Status: DISCONTINUED | OUTPATIENT
Start: 2025-06-11 | End: 2025-06-11 | Stop reason: HOSPADM

## 2025-06-11 RX ORDER — DIPHENHYDRAMINE HYDROCHLORIDE 50 MG/ML
25-50 INJECTION, SOLUTION INTRAMUSCULAR; INTRAVENOUS
Status: DISCONTINUED | OUTPATIENT
Start: 2025-06-11 | End: 2025-06-11 | Stop reason: HOSPADM

## 2025-06-11 RX ORDER — ATROPINE SULFATE 0.1 MG/ML
1 INJECTION INTRAVENOUS
Status: DISCONTINUED | OUTPATIENT
Start: 2025-06-11 | End: 2025-06-11 | Stop reason: HOSPADM

## 2025-06-11 RX ORDER — ONDANSETRON 2 MG/ML
4 INJECTION INTRAMUSCULAR; INTRAVENOUS EVERY 6 HOURS PRN
Status: DISCONTINUED | OUTPATIENT
Start: 2025-06-11 | End: 2025-06-11 | Stop reason: HOSPADM

## 2025-06-11 RX ORDER — ONDANSETRON 2 MG/ML
4 INJECTION INTRAMUSCULAR; INTRAVENOUS
Status: DISCONTINUED | OUTPATIENT
Start: 2025-06-11 | End: 2025-06-11 | Stop reason: HOSPADM

## 2025-06-11 RX ORDER — LIDOCAINE 40 MG/G
CREAM TOPICAL
Status: DISCONTINUED | OUTPATIENT
Start: 2025-06-11 | End: 2025-06-11 | Stop reason: HOSPADM

## 2025-06-11 RX ADMIN — FENTANYL CITRATE 50 MCG: 50 INJECTION, SOLUTION INTRAMUSCULAR; INTRAVENOUS at 11:46

## 2025-06-11 RX ADMIN — MIDAZOLAM 1 MG: 1 INJECTION INTRAMUSCULAR; INTRAVENOUS at 11:57

## 2025-06-11 RX ADMIN — MIDAZOLAM 1 MG: 1 INJECTION INTRAMUSCULAR; INTRAVENOUS at 11:46

## 2025-06-11 RX ADMIN — SODIUM CHLORIDE 500 ML: 0.9 INJECTION, SOLUTION INTRAVENOUS at 11:27

## 2025-06-11 RX ADMIN — FENTANYL CITRATE 50 MCG: 50 INJECTION, SOLUTION INTRAMUSCULAR; INTRAVENOUS at 11:57

## 2025-06-11 RX ADMIN — FENTANYL CITRATE 100 MCG: 50 INJECTION, SOLUTION INTRAMUSCULAR; INTRAVENOUS at 11:36

## 2025-06-11 RX ADMIN — MIDAZOLAM 2 MG: 1 INJECTION INTRAMUSCULAR; INTRAVENOUS at 11:36

## 2025-06-11 ASSESSMENT — ACTIVITIES OF DAILY LIVING (ADL)
ADLS_ACUITY_SCORE: 41

## 2025-06-11 NOTE — H&P
Pre-Endoscopy History and Physical     Reena Douglass MRN# [id number]   YOB: 1975 Age: 49 year old     Date of Procedure: 2025  Primary care provider: Marina Lilly  Type of Endoscopy: Colonoscopy with possible biopsy, possible polypectomy  Reason for Procedure: screen  Type of Anesthesia Anticipated: Conscious Sedation    HPI:    Reena is a 49 year old female who will be undergoing the above procedure.      A history and physical has been performed. The patient's medications and allergies have been reviewed. The risks and benefits of the procedure and the sedation options and risks were discussed with the patient.  All questions were answered and informed consent was obtained.      She denies a personal or family history of anesthesia complications or bleeding disorders.     Patient Active Problem List   Diagnosis    Genital herpes    CARDIOVASCULAR SCREENING; LDL GOAL LESS THAN 160    Migraine without status migrainosus, not intractable, unspecified migraine type    ASCUS of cervix with negative high risk HPV        Past Medical History:   Diagnosis Date    Diverticulitis of sigmoid colon 10/11/2013    Genital herpes, unspecified     Migraine         Past Surgical History:   Procedure Laterality Date    HC TOOTH EXTRACTION W/FORCEP      TUBAL LIGATION      ZZC EXCIS PTERYGIUM  ,     ZZC NONSPECIFIC PROCEDURE  1992    Spur-Heel       Social History     Tobacco Use    Smoking status: Former     Current packs/day: 0.00     Types: Cigarettes     Quit date: 2003     Years since quittin.4    Smokeless tobacco: Never   Substance Use Topics    Alcohol use: No       Family History   Problem Relation Age of Onset    Hypertension Mother         Alzheimer's    Lipids Mother     Respiratory Mother         asthma    C.A.D. Maternal Grandfather     Cancer Father         esophageal cancer    Hyperlipidemia Father     C.A.D. Maternal Uncle     Diabetes Maternal Aunt     Other Cancer  "Daughter        Prior to Admission medications    Medication Sig Start Date End Date Taking? Authorizing Provider   bisacodyl (DULCOLAX) 5 MG EC tablet 2 days prior to procedure, take 2 tablets at 4 pm. 1 day prior to procedure, take 2 tablets at 4 pm. For additional instructions refer to your colonoscopy prep instructions. 6/2/25   Aamir Everett MD   EXCEDRIN MIGRAINE OR 2 tabs prn (usually once weekly)    Reported, Patient   ondansetron (ZOFRAN ODT) 4 MG ODT tab Take 1 tablet (4 mg) by mouth every 8 hours as needed for nausea or vomiting. 12/19/24   Quentin Castaneda, DO   polyethylene glycol (GOLYTELY) 236 g suspension 2 days prior at 5pm, mix and drink half of a jug of Golytely. Drink an 8 oz. glass of Golytely every 15 minutes until half of the jug is gone. Place remainder of Golytely in the refrigerator. 1 day prior at 5 pm, drink the 2nd half of a jug of Golytely bowel prep. 6 hours before your check-in time, drink an 8 oz. glass of Golytely every 15 minutes until half of the 2nd jug of Golytely is gone. Discard remainder of second jug. 6/2/25   Aamir Everett MD   SUMAtriptan (IMITREX) 100 MG tablet TAKE 1 TABLET(100 MG) BY MOUTH AT ONSET OF HEADACHE FOR MIGRAINE. MAY REPEAT IN 2 HOURS AS NEEDED:. MAX 2 PER DAY 8/22/24   Marina Lilly, CNP       Allergies   Allergen Reactions    Hydrocodone-Acetaminophen Nausea and Vomiting        REVIEW OF SYSTEMS:   5 point ROS negative except as noted above in HPI, including Gen., Resp., CV, GI &  system review.    PHYSICAL EXAM:   There were no vitals taken for this visit. Estimated body mass index is 20.03 kg/m  as calculated from the following:    Height as of 12/18/24: 1.6 m (5' 3\").    Weight as of 12/18/24: 51.3 kg (113 lb 1.6 oz).   GENERAL APPEARANCE: alert, and oriented  MENTAL STATUS: alert  AIRWAY EXAM: Mallampatti Class I (visualization of the soft palate, fauces, uvula, anterior and posterior pillars)  RESP: lungs clear to auscultation - no " rales, rhonchi or wheezes  CV: regular rates and rhythm  DIAGNOSTICS:    Not indicated    IMPRESSION   ASA Class 2 - Mild systemic disease    PLAN:   Plan for Colonoscopy with possible biopsy, possible polypectomy. We discussed the risks, benefits and alternatives and the patient wished to proceed.    The above has been forwarded to the consulting provider.      Signed Electronically by: Aamir Everett MD  June 11, 2025

## 2025-07-23 ENCOUNTER — PATIENT OUTREACH (OUTPATIENT)
Dept: CARE COORDINATION | Facility: CLINIC | Age: 50
End: 2025-07-23
Payer: COMMERCIAL

## 2025-08-06 ENCOUNTER — PATIENT OUTREACH (OUTPATIENT)
Dept: CARE COORDINATION | Facility: CLINIC | Age: 50
End: 2025-08-06
Payer: COMMERCIAL

## 2025-08-17 ENCOUNTER — HEALTH MAINTENANCE LETTER (OUTPATIENT)
Age: 50
End: 2025-08-17

## (undated) RX ORDER — FENTANYL CITRATE 50 UG/ML
INJECTION, SOLUTION INTRAMUSCULAR; INTRAVENOUS
Status: DISPENSED
Start: 2025-06-11